# Patient Record
Sex: MALE | Race: WHITE | Employment: FULL TIME | URBAN - METROPOLITAN AREA
[De-identification: names, ages, dates, MRNs, and addresses within clinical notes are randomized per-mention and may not be internally consistent; named-entity substitution may affect disease eponyms.]

---

## 2020-11-16 ENCOUNTER — HOSPITAL ENCOUNTER (OUTPATIENT)
Dept: LAB | Age: 65
Discharge: HOME | End: 2020-11-16

## 2020-11-16 ENCOUNTER — HOSPITAL ENCOUNTER (OUTPATIENT)
Dept: PREADMISSION TESTING | Age: 65
Discharge: HOME | End: 2020-11-16
Payer: COMMERCIAL

## 2020-11-16 ENCOUNTER — TRANSCRIBE ORDER (OUTPATIENT)
Dept: REGISTRATION | Age: 65
End: 2020-11-16

## 2020-11-16 DIAGNOSIS — M16.12 PRIMARY OSTEOARTHRITIS OF LEFT HIP: Primary | ICD-10-CM

## 2020-11-16 DIAGNOSIS — M16.12 PRIMARY OSTEOARTHRITIS OF LEFT HIP: ICD-10-CM

## 2020-11-16 LAB — SENTARA SPECIMEN COL,SENBCF: NORMAL

## 2020-11-16 PROCEDURE — 93005 ELECTROCARDIOGRAM TRACING: CPT

## 2020-11-16 PROCEDURE — 99001 SPECIMEN HANDLING PT-LAB: CPT

## 2020-11-17 LAB
ATRIAL RATE: 81 BPM
CALCULATED P AXIS, ECG09: 71 DEGREES
CALCULATED R AXIS, ECG10: 84 DEGREES
CALCULATED T AXIS, ECG11: 54 DEGREES
DIAGNOSIS, 93000: NORMAL
P-R INTERVAL, ECG05: 134 MS
Q-T INTERVAL, ECG07: 384 MS
QRS DURATION, ECG06: 86 MS
QTC CALCULATION (BEZET), ECG08: 446 MS
VENTRICULAR RATE, ECG03: 81 BPM

## 2020-12-04 ENCOUNTER — HOSPITAL ENCOUNTER (OUTPATIENT)
Dept: PREADMISSION TESTING | Age: 65
Discharge: HOME | End: 2020-12-04
Payer: COMMERCIAL

## 2020-12-04 PROCEDURE — 87635 SARS-COV-2 COVID-19 AMP PRB: CPT

## 2020-12-05 LAB — SARS-COV-2, COV2NT: NOT DETECTED

## 2020-12-09 ENCOUNTER — ANESTHESIA EVENT (OUTPATIENT)
Dept: SURGERY | Age: 65
End: 2020-12-09
Payer: COMMERCIAL

## 2020-12-09 PROBLEM — M16.12 OSTEOARTHRITIS OF LEFT HIP: Chronic | Status: ACTIVE | Noted: 2020-12-09

## 2020-12-10 ENCOUNTER — ANESTHESIA (OUTPATIENT)
Dept: SURGERY | Age: 65
End: 2020-12-10
Payer: COMMERCIAL

## 2020-12-10 ENCOUNTER — HOME HEALTH ADMISSION (OUTPATIENT)
Dept: HOME HEALTH SERVICES | Facility: HOME HEALTH | Age: 65
End: 2020-12-10
Payer: COMMERCIAL

## 2020-12-10 ENCOUNTER — APPOINTMENT (OUTPATIENT)
Dept: GENERAL RADIOLOGY | Age: 65
End: 2020-12-10
Attending: ORTHOPAEDIC SURGERY
Payer: COMMERCIAL

## 2020-12-10 ENCOUNTER — APPOINTMENT (OUTPATIENT)
Dept: GENERAL RADIOLOGY | Age: 65
End: 2020-12-10
Attending: PHYSICIAN ASSISTANT
Payer: COMMERCIAL

## 2020-12-10 ENCOUNTER — HOSPITAL ENCOUNTER (OUTPATIENT)
Age: 65
Discharge: HOME HEALTH | End: 2020-12-10
Attending: ORTHOPAEDIC SURGERY | Admitting: ORTHOPAEDIC SURGERY
Payer: COMMERCIAL

## 2020-12-10 VITALS
DIASTOLIC BLOOD PRESSURE: 84 MMHG | HEART RATE: 73 BPM | HEIGHT: 69 IN | SYSTOLIC BLOOD PRESSURE: 139 MMHG | OXYGEN SATURATION: 95 % | RESPIRATION RATE: 14 BRPM | WEIGHT: 224.25 LBS | BODY MASS INDEX: 33.21 KG/M2 | TEMPERATURE: 97 F

## 2020-12-10 DIAGNOSIS — M16.12 PRIMARY OSTEOARTHRITIS OF LEFT HIP: Primary | Chronic | ICD-10-CM

## 2020-12-10 LAB
ABO + RH BLD: NORMAL
BLOOD GROUP ANTIBODIES SERPL: NORMAL
SPECIMEN EXP DATE BLD: NORMAL

## 2020-12-10 PROCEDURE — 97116 GAIT TRAINING THERAPY: CPT

## 2020-12-10 PROCEDURE — 77030034694 HC SCPL CANADY PLSM DISP USMD -E: Performed by: ORTHOPAEDIC SURGERY

## 2020-12-10 PROCEDURE — 97535 SELF CARE MNGMENT TRAINING: CPT

## 2020-12-10 PROCEDURE — 77030037713 HC CLOSR DEV INCIS ZIP STRY -B: Performed by: ORTHOPAEDIC SURGERY

## 2020-12-10 PROCEDURE — 74011250637 HC RX REV CODE- 250/637: Performed by: SPECIALIST

## 2020-12-10 PROCEDURE — 76010000149 HC OR TIME 1 TO 1.5 HR: Performed by: ORTHOPAEDIC SURGERY

## 2020-12-10 PROCEDURE — 76060000033 HC ANESTHESIA 1 TO 1.5 HR: Performed by: ORTHOPAEDIC SURGERY

## 2020-12-10 PROCEDURE — 74011000250 HC RX REV CODE- 250: Performed by: SPECIALIST

## 2020-12-10 PROCEDURE — 77030041075 HC DRSG AG OPTIFRM MDII -B: Performed by: ORTHOPAEDIC SURGERY

## 2020-12-10 PROCEDURE — 74011250637 HC RX REV CODE- 250/637: Performed by: ORTHOPAEDIC SURGERY

## 2020-12-10 PROCEDURE — 77030040361 HC SLV COMPR DVT MDII -B: Performed by: ORTHOPAEDIC SURGERY

## 2020-12-10 PROCEDURE — 77030020782 HC GWN BAIR PAWS FLX 3M -B: Performed by: ORTHOPAEDIC SURGERY

## 2020-12-10 PROCEDURE — 76210000006 HC OR PH I REC 0.5 TO 1 HR: Performed by: ORTHOPAEDIC SURGERY

## 2020-12-10 PROCEDURE — 77030003666 HC NDL SPINAL BD -A: Performed by: ORTHOPAEDIC SURGERY

## 2020-12-10 PROCEDURE — 74011250636 HC RX REV CODE- 250/636: Performed by: PHYSICIAN ASSISTANT

## 2020-12-10 PROCEDURE — 97161 PT EVAL LOW COMPLEX 20 MIN: CPT

## 2020-12-10 PROCEDURE — 77030038010: Performed by: ORTHOPAEDIC SURGERY

## 2020-12-10 PROCEDURE — 2709999900 HC NON-CHARGEABLE SUPPLY: Performed by: ORTHOPAEDIC SURGERY

## 2020-12-10 PROCEDURE — 74011250636 HC RX REV CODE- 250/636: Performed by: ORTHOPAEDIC SURGERY

## 2020-12-10 PROCEDURE — C1776 JOINT DEVICE (IMPLANTABLE): HCPCS | Performed by: ORTHOPAEDIC SURGERY

## 2020-12-10 PROCEDURE — 77030014144 HC TY SPN ANES BBMI -B: Performed by: SPECIALIST

## 2020-12-10 PROCEDURE — 86900 BLOOD TYPING SEROLOGIC ABO: CPT

## 2020-12-10 PROCEDURE — 73501 X-RAY EXAM HIP UNI 1 VIEW: CPT

## 2020-12-10 PROCEDURE — 74011000250 HC RX REV CODE- 250: Performed by: ORTHOPAEDIC SURGERY

## 2020-12-10 PROCEDURE — 77030027138 HC INCENT SPIROMETER -A: Performed by: ORTHOPAEDIC SURGERY

## 2020-12-10 PROCEDURE — 74011250636 HC RX REV CODE- 250/636: Performed by: SPECIALIST

## 2020-12-10 PROCEDURE — 77030031139 HC SUT VCRL2 J&J -A: Performed by: ORTHOPAEDIC SURGERY

## 2020-12-10 PROCEDURE — 77030013708 HC HNDPC SUC IRR PULS STRY –B: Performed by: ORTHOPAEDIC SURGERY

## 2020-12-10 PROCEDURE — 77010033678 HC OXYGEN DAILY

## 2020-12-10 PROCEDURE — 97165 OT EVAL LOW COMPLEX 30 MIN: CPT

## 2020-12-10 RX ORDER — DEXAMETHASONE SODIUM PHOSPHATE 4 MG/ML
8 INJECTION, SOLUTION INTRA-ARTICULAR; INTRALESIONAL; INTRAMUSCULAR; INTRAVENOUS; SOFT TISSUE ONCE
Status: COMPLETED | OUTPATIENT
Start: 2020-12-10 | End: 2020-12-10

## 2020-12-10 RX ORDER — MAGNESIUM SULFATE 100 %
4 CRYSTALS MISCELLANEOUS AS NEEDED
Status: DISCONTINUED | OUTPATIENT
Start: 2020-12-10 | End: 2020-12-10 | Stop reason: HOSPADM

## 2020-12-10 RX ORDER — OXYCODONE HYDROCHLORIDE 5 MG/1
5-10 TABLET ORAL
Status: DISCONTINUED | OUTPATIENT
Start: 2020-12-10 | End: 2020-12-10 | Stop reason: HOSPADM

## 2020-12-10 RX ORDER — PROPOFOL 10 MG/ML
INJECTION, EMULSION INTRAVENOUS
Status: DISCONTINUED | OUTPATIENT
Start: 2020-12-10 | End: 2020-12-10 | Stop reason: HOSPADM

## 2020-12-10 RX ORDER — DIPHENHYDRAMINE HYDROCHLORIDE 50 MG/ML
12.5 INJECTION, SOLUTION INTRAMUSCULAR; INTRAVENOUS
Status: DISCONTINUED | OUTPATIENT
Start: 2020-12-10 | End: 2020-12-10 | Stop reason: HOSPADM

## 2020-12-10 RX ORDER — METOCLOPRAMIDE HYDROCHLORIDE 5 MG/ML
10 INJECTION INTRAMUSCULAR; INTRAVENOUS
Status: DISCONTINUED | OUTPATIENT
Start: 2020-12-10 | End: 2020-12-10 | Stop reason: HOSPADM

## 2020-12-10 RX ORDER — CEFAZOLIN SODIUM/WATER 2 G/20 ML
2 SYRINGE (ML) INTRAVENOUS ONCE
Status: COMPLETED | OUTPATIENT
Start: 2020-12-10 | End: 2020-12-10

## 2020-12-10 RX ORDER — SODIUM CHLORIDE 0.9 % (FLUSH) 0.9 %
5-40 SYRINGE (ML) INJECTION AS NEEDED
Status: DISCONTINUED | OUTPATIENT
Start: 2020-12-10 | End: 2020-12-10 | Stop reason: HOSPADM

## 2020-12-10 RX ORDER — FENTANYL CITRATE 50 UG/ML
25 INJECTION, SOLUTION INTRAMUSCULAR; INTRAVENOUS AS NEEDED
Status: DISCONTINUED | OUTPATIENT
Start: 2020-12-10 | End: 2020-12-10 | Stop reason: HOSPADM

## 2020-12-10 RX ORDER — ACETAMINOPHEN 500 MG
1000 TABLET ORAL ONCE
Status: COMPLETED | OUTPATIENT
Start: 2020-12-10 | End: 2020-12-10

## 2020-12-10 RX ORDER — MELOXICAM 7.5 MG/1
7.5 TABLET ORAL 2 TIMES DAILY
Qty: 28 TAB | Refills: 0 | Status: SHIPPED | OUTPATIENT
Start: 2020-12-10 | End: 2020-12-24

## 2020-12-10 RX ORDER — ONDANSETRON 2 MG/ML
4 INJECTION INTRAMUSCULAR; INTRAVENOUS ONCE
Status: DISCONTINUED | OUTPATIENT
Start: 2020-12-10 | End: 2020-12-10 | Stop reason: HOSPADM

## 2020-12-10 RX ORDER — PANTOPRAZOLE SODIUM 40 MG/1
40 TABLET, DELAYED RELEASE ORAL DAILY
Status: DISCONTINUED | OUTPATIENT
Start: 2020-12-10 | End: 2020-12-10 | Stop reason: HOSPADM

## 2020-12-10 RX ORDER — ONDANSETRON 2 MG/ML
4 INJECTION INTRAMUSCULAR; INTRAVENOUS
Status: DISCONTINUED | OUTPATIENT
Start: 2020-12-10 | End: 2020-12-10 | Stop reason: HOSPADM

## 2020-12-10 RX ORDER — SODIUM CHLORIDE 0.9 % (FLUSH) 0.9 %
5-40 SYRINGE (ML) INJECTION EVERY 8 HOURS
Status: DISCONTINUED | OUTPATIENT
Start: 2020-12-10 | End: 2020-12-10 | Stop reason: HOSPADM

## 2020-12-10 RX ORDER — CELECOXIB 100 MG/1
200 CAPSULE ORAL ONCE
Status: COMPLETED | OUTPATIENT
Start: 2020-12-10 | End: 2020-12-10

## 2020-12-10 RX ORDER — SODIUM CHLORIDE 9 MG/ML
300 INJECTION, SOLUTION INTRAVENOUS CONTINUOUS
Status: DISCONTINUED | OUTPATIENT
Start: 2020-12-10 | End: 2020-12-10 | Stop reason: HOSPADM

## 2020-12-10 RX ORDER — HYDROMORPHONE HYDROCHLORIDE 1 MG/ML
0.5 INJECTION, SOLUTION INTRAMUSCULAR; INTRAVENOUS; SUBCUTANEOUS
Status: DISCONTINUED | OUTPATIENT
Start: 2020-12-10 | End: 2020-12-10 | Stop reason: HOSPADM

## 2020-12-10 RX ORDER — LANOLIN ALCOHOL/MO/W.PET/CERES
1 CREAM (GRAM) TOPICAL 3 TIMES DAILY
Status: DISCONTINUED | OUTPATIENT
Start: 2020-12-10 | End: 2020-12-10 | Stop reason: HOSPADM

## 2020-12-10 RX ORDER — ZOLPIDEM TARTRATE 5 MG/1
5-10 TABLET ORAL
Status: DISCONTINUED | OUTPATIENT
Start: 2020-12-10 | End: 2020-12-10 | Stop reason: HOSPADM

## 2020-12-10 RX ORDER — DEXTROSE MONOHYDRATE 100 MG/ML
125-250 INJECTION, SOLUTION INTRAVENOUS AS NEEDED
Status: DISCONTINUED | OUTPATIENT
Start: 2020-12-10 | End: 2020-12-10 | Stop reason: HOSPADM

## 2020-12-10 RX ORDER — OXYCODONE HYDROCHLORIDE 5 MG/1
5 TABLET ORAL
Qty: 42 TAB | Refills: 0 | Status: SHIPPED | OUTPATIENT
Start: 2020-12-10 | End: 2020-12-17

## 2020-12-10 RX ORDER — PREGABALIN 25 MG/1
25 CAPSULE ORAL ONCE
Status: COMPLETED | OUTPATIENT
Start: 2020-12-10 | End: 2020-12-10

## 2020-12-10 RX ORDER — TRANEXAMIC ACID 650 1/1
1950 TABLET ORAL ONCE
Status: COMPLETED | OUTPATIENT
Start: 2020-12-10 | End: 2020-12-10

## 2020-12-10 RX ORDER — SODIUM CHLORIDE, SODIUM LACTATE, POTASSIUM CHLORIDE, CALCIUM CHLORIDE 600; 310; 30; 20 MG/100ML; MG/100ML; MG/100ML; MG/100ML
100 INJECTION, SOLUTION INTRAVENOUS CONTINUOUS
Status: DISCONTINUED | OUTPATIENT
Start: 2020-12-10 | End: 2020-12-10 | Stop reason: HOSPADM

## 2020-12-10 RX ORDER — ASPIRIN 81 MG/1
81 TABLET ORAL 2 TIMES DAILY
Status: DISCONTINUED | OUTPATIENT
Start: 2020-12-10 | End: 2020-12-10 | Stop reason: HOSPADM

## 2020-12-10 RX ORDER — KETAMINE HCL IN 0.9 % NACL 50 MG/5 ML
SYRINGE (ML) INTRAVENOUS AS NEEDED
Status: DISCONTINUED | OUTPATIENT
Start: 2020-12-10 | End: 2020-12-10 | Stop reason: HOSPADM

## 2020-12-10 RX ORDER — LIDOCAINE HYDROCHLORIDE 20 MG/ML
INJECTION, SOLUTION EPIDURAL; INFILTRATION; INTRACAUDAL; PERINEURAL AS NEEDED
Status: DISCONTINUED | OUTPATIENT
Start: 2020-12-10 | End: 2020-12-10 | Stop reason: HOSPADM

## 2020-12-10 RX ORDER — SODIUM CHLORIDE, SODIUM LACTATE, POTASSIUM CHLORIDE, CALCIUM CHLORIDE 600; 310; 30; 20 MG/100ML; MG/100ML; MG/100ML; MG/100ML
125 INJECTION, SOLUTION INTRAVENOUS CONTINUOUS
Status: DISCONTINUED | OUTPATIENT
Start: 2020-12-10 | End: 2020-12-10 | Stop reason: HOSPADM

## 2020-12-10 RX ORDER — CEFADROXIL 500 MG/1
500 CAPSULE ORAL 2 TIMES DAILY
Qty: 10 CAP | Refills: 0 | Status: SHIPPED | OUTPATIENT
Start: 2020-12-10 | End: 2020-12-15

## 2020-12-10 RX ORDER — ASPIRIN 81 MG/1
81 TABLET ORAL 2 TIMES DAILY
Qty: 42 TAB | Refills: 0 | Status: SHIPPED | OUTPATIENT
Start: 2020-12-10 | End: 2020-12-31

## 2020-12-10 RX ORDER — FENTANYL CITRATE 50 UG/ML
50 INJECTION, SOLUTION INTRAMUSCULAR; INTRAVENOUS
Status: DISCONTINUED | OUTPATIENT
Start: 2020-12-10 | End: 2020-12-10 | Stop reason: HOSPADM

## 2020-12-10 RX ORDER — ACETAMINOPHEN 500 MG
1000 TABLET ORAL ONCE
Status: DISCONTINUED | OUTPATIENT
Start: 2020-12-10 | End: 2020-12-10

## 2020-12-10 RX ORDER — PROPOFOL 10 MG/ML
INJECTION, EMULSION INTRAVENOUS AS NEEDED
Status: DISCONTINUED | OUTPATIENT
Start: 2020-12-10 | End: 2020-12-10 | Stop reason: HOSPADM

## 2020-12-10 RX ORDER — NALOXONE HYDROCHLORIDE 0.4 MG/ML
0.4 INJECTION, SOLUTION INTRAMUSCULAR; INTRAVENOUS; SUBCUTANEOUS AS NEEDED
Status: DISCONTINUED | OUTPATIENT
Start: 2020-12-10 | End: 2020-12-10 | Stop reason: HOSPADM

## 2020-12-10 RX ORDER — CEFAZOLIN SODIUM/WATER 2 G/20 ML
2 SYRINGE (ML) INTRAVENOUS EVERY 8 HOURS
Status: DISCONTINUED | OUTPATIENT
Start: 2020-12-10 | End: 2020-12-10 | Stop reason: HOSPADM

## 2020-12-10 RX ORDER — OXYCODONE AND ACETAMINOPHEN 5; 325 MG/1; MG/1
1 TABLET ORAL AS NEEDED
Status: DISCONTINUED | OUTPATIENT
Start: 2020-12-10 | End: 2020-12-10 | Stop reason: HOSPADM

## 2020-12-10 RX ORDER — KETOROLAC TROMETHAMINE 15 MG/ML
15 INJECTION, SOLUTION INTRAMUSCULAR; INTRAVENOUS EVERY 6 HOURS
Status: DISCONTINUED | OUTPATIENT
Start: 2020-12-10 | End: 2020-12-10 | Stop reason: HOSPADM

## 2020-12-10 RX ORDER — DOCUSATE SODIUM 100 MG/1
100 CAPSULE, LIQUID FILLED ORAL DAILY
Status: DISCONTINUED | OUTPATIENT
Start: 2020-12-11 | End: 2020-12-10 | Stop reason: HOSPADM

## 2020-12-10 RX ORDER — SODIUM CHLORIDE 9 MG/ML
125 INJECTION, SOLUTION INTRAVENOUS CONTINUOUS
Status: DISCONTINUED | OUTPATIENT
Start: 2020-12-10 | End: 2020-12-10 | Stop reason: HOSPADM

## 2020-12-10 RX ORDER — KETOROLAC TROMETHAMINE 15 MG/ML
15 INJECTION, SOLUTION INTRAMUSCULAR; INTRAVENOUS
Status: DISCONTINUED | OUTPATIENT
Start: 2020-12-10 | End: 2020-12-10 | Stop reason: HOSPADM

## 2020-12-10 RX ORDER — PANTOPRAZOLE SODIUM 40 MG/1
40 TABLET, DELAYED RELEASE ORAL DAILY
Status: DISCONTINUED | OUTPATIENT
Start: 2020-12-10 | End: 2020-12-10

## 2020-12-10 RX ORDER — MIDAZOLAM HYDROCHLORIDE 1 MG/ML
INJECTION, SOLUTION INTRAMUSCULAR; INTRAVENOUS AS NEEDED
Status: DISCONTINUED | OUTPATIENT
Start: 2020-12-10 | End: 2020-12-10 | Stop reason: HOSPADM

## 2020-12-10 RX ORDER — ACETAMINOPHEN 325 MG/1
650 TABLET ORAL EVERY 6 HOURS
Status: DISCONTINUED | OUTPATIENT
Start: 2020-12-10 | End: 2020-12-10 | Stop reason: HOSPADM

## 2020-12-10 RX ADMIN — PROPOFOL 20 MG: 10 INJECTION, EMULSION INTRAVENOUS at 10:10

## 2020-12-10 RX ADMIN — PANTOPRAZOLE SODIUM 40 MG: 40 TABLET, DELAYED RELEASE ORAL at 07:05

## 2020-12-10 RX ADMIN — PREGABALIN 25 MG: 25 CAPSULE ORAL at 07:05

## 2020-12-10 RX ADMIN — Medication 10 MG: at 09:55

## 2020-12-10 RX ADMIN — MIDAZOLAM 2 MG: 1 INJECTION INTRAMUSCULAR; INTRAVENOUS at 09:35

## 2020-12-10 RX ADMIN — PROPOFOL 20 MG: 10 INJECTION, EMULSION INTRAVENOUS at 09:39

## 2020-12-10 RX ADMIN — DEXAMETHASONE SODIUM PHOSPHATE 8 MG: 4 INJECTION, SOLUTION INTRAMUSCULAR; INTRAVENOUS at 07:06

## 2020-12-10 RX ADMIN — Medication 10 MG: at 09:43

## 2020-12-10 RX ADMIN — Medication 20 MG: at 09:46

## 2020-12-10 RX ADMIN — LIDOCAINE HYDROCHLORIDE 20 MG: 20 INJECTION, SOLUTION EPIDURAL; INFILTRATION; INTRACAUDAL; PERINEURAL at 09:39

## 2020-12-10 RX ADMIN — PROPOFOL 100 MCG/KG/MIN: 10 INJECTION, EMULSION INTRAVENOUS at 10:16

## 2020-12-10 RX ADMIN — LIDOCAINE HYDROCHLORIDE 20 MG: 20 INJECTION, SOLUTION EPIDURAL; INFILTRATION; INTRACAUDAL; PERINEURAL at 10:24

## 2020-12-10 RX ADMIN — CELECOXIB 200 MG: 100 CAPSULE ORAL at 07:05

## 2020-12-10 RX ADMIN — TRANEXAMIC ACID 1950 MG: 650 TABLET ORAL at 07:05

## 2020-12-10 RX ADMIN — SODIUM CHLORIDE 300 ML/HR: 900 INJECTION, SOLUTION INTRAVENOUS at 12:24

## 2020-12-10 RX ADMIN — PROPOFOL 20 MG: 10 INJECTION, EMULSION INTRAVENOUS at 10:24

## 2020-12-10 RX ADMIN — SODIUM CHLORIDE, SODIUM LACTATE, POTASSIUM CHLORIDE, AND CALCIUM CHLORIDE 125 ML/HR: 600; 310; 30; 20 INJECTION, SOLUTION INTRAVENOUS at 07:04

## 2020-12-10 RX ADMIN — MIDAZOLAM 2 MG: 1 INJECTION INTRAMUSCULAR; INTRAVENOUS at 10:15

## 2020-12-10 RX ADMIN — Medication 2 G: at 09:59

## 2020-12-10 RX ADMIN — MIDAZOLAM 2 MG: 1 INJECTION INTRAMUSCULAR; INTRAVENOUS at 09:46

## 2020-12-10 RX ADMIN — LIDOCAINE HYDROCHLORIDE 20 MG: 20 INJECTION, SOLUTION EPIDURAL; INFILTRATION; INTRACAUDAL; PERINEURAL at 10:10

## 2020-12-10 RX ADMIN — MEPIVACAINE HYDROCHLORIDE 44 MG: 20 INJECTION, SOLUTION EPIDURAL; INFILTRATION at 10:00

## 2020-12-10 RX ADMIN — SODIUM CHLORIDE, SODIUM LACTATE, POTASSIUM CHLORIDE, AND CALCIUM CHLORIDE 1000 ML: 600; 310; 30; 20 INJECTION, SOLUTION INTRAVENOUS at 07:05

## 2020-12-10 RX ADMIN — SODIUM CHLORIDE, SODIUM LACTATE, POTASSIUM CHLORIDE, AND CALCIUM CHLORIDE: 600; 310; 30; 20 INJECTION, SOLUTION INTRAVENOUS at 10:18

## 2020-12-10 RX ADMIN — Medication 10 MG: at 09:35

## 2020-12-10 RX ADMIN — ACETAMINOPHEN 1000 MG: 500 TABLET ORAL at 07:05

## 2020-12-10 NOTE — INTERVAL H&P NOTE
Update History & Physical 
 
The Patient's History and Physical of December 9,2020,  
  was reviewed with the patient and I examined the patient. There was no change. The surgical site was confirmed by the patient and me. Plan:  The risk, benefits, expected outcome, and alternative to the recommended procedure have been discussed with the patient. Patient understands and wants to proceed with the procedure.  
 
Electronically signed by Nathan Clarke MD on 12/10/2020 at 7:18 AM

## 2020-12-10 NOTE — PROGRESS NOTES
Problem: Mobility Impaired (Adult and Pediatric)  Goal: *Acute Goals and Plan of Care (Insert Text)  Description: PT goals to be met in 1 day:  Pt will be able to perform supine<>sit SBA for transfers at home. Pt will be able to perform sit<>stand SBA for increased ability to transfer at home safely. Pt will be able to participate in gt training >125' w/ RW, WBAT, GB and CGA/SBA for improved ability in home upon d/c. Pt will be able to perform stair training step to pattern, B/U rail and CGA to obtain safe entry into home upon d/c. Pt will be educated regarding HEP per MD protocol for optimal AROM/strength outcomes. Note: [x]  Patient has met MD mobilization critieria for d/c home   [x]  Recommend HH with 24 hour adult care   []  Benefit from additional acute PT session to address:      PHYSICAL THERAPY EVALUATION    Patient: Marybel Kraft (41 y.o. male)  Date: 12/10/2020  Primary Diagnosis: Osteoarthritis of left hip [M16.12]  Procedure(s) (LRB):  LEFT TOTAL HIP REPLACEMENT ANTERIOR APPROACH WITH C-ARM (Left) Day of Surgery   Precautions:   Fall, WBAT    PLOF: Independent functional mobility w/o use of AD    ASSESSMENT :  Based on the objective data described below, the patient presents with decreased mobility in regards to bed mobility, transfers, gt quality and tolerance, balance, stair negotiation and safety due to L DEANN surgery. Decreased AROM of L hip, dec strength of L hip, pain in L hip, dec sensation of L hip also impacting pt functional mobility. Pt rating pain on numerical pain scale pre/post and during session 4/10. Pt and wife ed regarding mobility safety, WB, HEP, ice application/use, elevation, environmental safety and home safe techniques. Pt sitting in recliner upon arrival.  Pt able to perform sit<>stand w/ CGA/SBA. Safety vc required throughout session to reinforce safety. Pt able to participate in gt training using RW, GB, WBAT and CGA w/ antalgic gt pattern.   Pt was able to participate in stair training using step to pattern, B rails and CGA. Answered questions by pt and wife in regards to PT and mobility. Pt left sitting in recliner w/ all needs within reach and ice pack to L hip. Nurse Zee Carias aware of session and outcomes. Recommend HHPT with responsible adult care at least 24 hours upon hospital d/c. Patient will benefit from skilled intervention to address the above impairments. Patient's rehabilitation potential is considered to be Good  Factors which may influence rehabilitation potential include:   []         None noted  []         Mental ability/status  []         Medical condition  []         Home/family situation and support systems  []         Safety awareness  [x]         Pain tolerance/management  []         Other:      PLAN :  Recommendations and Planned Interventions:   [x]           Bed Mobility Training             []    Neuromuscular Re-Education  [x]           Transfer Training                   []    Orthotic/Prosthetic Training  [x]           Gait Training                          [x]    Modalities  [x]           Therapeutic Exercises           [x]    Edema Management/Control  [x]           Therapeutic Activities            [x]    Family Training/Education  [x]           Patient Education  []           Other (comment):    Frequency/Duration: Patient will be followed by physical therapy twice daily to address goals. Discharge Recommendations: Home Health  Further Equipment Recommendations for Discharge: N/A     SUBJECTIVE:   Patient stated I am surprised I feel so well.     OBJECTIVE DATA SUMMARY:     Past Medical History:   Diagnosis Date    Arthritis      Past Surgical History:   Procedure Laterality Date    HX KNEE ARTHROSCOPY  left    HX OTHER SURGICAL Right     collar bone surgery    HX OTHER SURGICAL  2019    keratosis removed from scalp    HX REFRACTIVE SURGERY      HX THORACIC LAMINECTOMY      HX TONSILLECTOMY      HX WISDOM TEETH EXTRACTION Barriers to Learning/Limitations: yes;  anesthesia  Compensate with: Visual Cues, Verbal Cues, and Tactile Cues  Home Situation:  Home Situation  Home Environment: Private residence  # Steps to Enter: 1  One/Two Story Residence: One story  Living Alone: No  Support Systems: Spouse/Significant Other/Partner  Patient Expects to be Discharged to[de-identified] Private residence  Current DME Used/Available at Home: Walker, rolling  Tub or Shower Type: Shower  Critical Behavior:  Neurologic State: Alert  Orientation Level: Oriented to person;Oriented to place;Oriented to situation  Cognition: Follows commands  Safety/Judgement: Awareness of environment  Psychosocial  Patient Behaviors: Calm; Cooperative  Family  Behaviors: Calm;Supportive  Purposeful Interaction: Yes  Pt Identified Daily Priority: Clinical issues (comment)  Caring Interventions: Reassure; Therapeutic modalities  Reassure: Therapeutic listening; Informing  Therapeutic Modalities: Deep breathing  Skin Condition/Temp: Dry;Warm  Family  Behaviors: Calm;Supportive  Skin Integrity: Incision (comment)(L hip)  Skin Integumentary  Skin Color: Appropriate for ethnicity  Skin Condition/Temp: Dry;Warm  Skin Integrity: Incision (comment)(L hip)  Strength:    Strength: Generally decreased, functional  Tone & Sensation:   Tone: Normal  Sensation: Impaired(L hip)  Range Of Motion:  AROM: Generally decreased, functional  Functional Mobility:  Bed Mobility:  Scooting: Stand-by assistance(vc)  Transfers:  Sit to Stand: Contact guard assistance(vc)  Stand to Sit: Contact guard assistance;Stand-by assistance(vc)  Balance:   Sitting: Intact  Standing: Intact; With support  Ambulation/Gait Training:  Distance (ft): 125 Feet (ft)  Assistive Device: Walker, rolling;Gait belt  Ambulation - Level of Assistance: Contact guard assistance(vc)  Gait Abnormalities: Antalgic;Decreased step clearance; Step to gait  Left Side Weight Bearing: As tolerated  Base of Support: Shift to right  Stance: Left decreased  Speed/Mitzi: Slow  Step Length: Left shortened;Right shortened  Swing Pattern: Left asymmetrical;Right asymmetrical  Interventions: Safety awareness training; Tactile cues; Verbal cues; Visual/Demos  Stairs:  Number of Stairs Trained: 5  Stairs - Level of Assistance: Contact guard assistance(vc)  Rail Use: Both  Therapeutic Exercises:   Encouraged HEP  Pain:  Pain level pre-treatment: 4/10   Pain level post-treatment: 4/10   Pain Intervention(s) : Medication (see MAR); Rest, Ice, Repositioning  Response to intervention: Nurse notified, See doc flow    Activity Tolerance:   Fair   Please refer to the flowsheet for vital signs taken during this treatment. After treatment:   [x]         Patient left in no apparent distress sitting up in chair  []         Patient left in no apparent distress in bed  [x]         Call bell left within reach  [x]         Nursing notified  [x]         Caregiver present  []         Bed alarm activated  []         SCDs applied    COMMUNICATION/EDUCATION:   [x]         Role of Physical Therapy in the acute care setting. [x]         Fall prevention education was provided and the patient/caregiver indicated understanding. [x]         Patient/family have participated as able in goal setting and plan of care. [x]         Patient/family agree to work toward stated goals and plan of care. []         Patient understands intent and goals of therapy, but is neutral about his/her participation. []         Patient is unable to participate in goal setting/plan of care: ongoing with therapy staff.  []         Other:     Thank you for this referral.  Davie Arroyo, PT   Time Calculation: 23 mins      Eval Complexity: History: HIGH Complexity :3+ comorbidities / personal factors will impact the outcome/ POC Exam:MEDIUM Complexity : 3 Standardized tests and measures addressing body structure, function, activity limitation and / or participation in recreation  Presentation: LOW Complexity : Stable, uncomplicated  Clinical Decision Making:Low Complexity    Overall Complexity:LOW

## 2020-12-10 NOTE — PROGRESS NOTES
Problem: Self Care Deficits Care Plan (Adult)  Goal: *Acute Goals and Plan of Care (Insert Text)  Description: Initial Occupational Therapy Goals (12/10/2020) Within 7 day(s):    1. Patient will perform grooming standing sinkside with supervision for increased independence with ADLs. 2. Patient will perform LB dressing with supervision & A/E PRN for increased independence with ADLs. 3. Patient will perform toilet transfer with supervision for increased independence with ADLs. 4. Patient will perform all aspects of toileting with supervision for increased independence with ADLs. 5. Patient will independently apply energy conservation techniques with 1 verbal cue(s)for increased independence with ADLs. 6. Patient will perform bathroom mobility with supervision for increased independence/safety with ADLs. Outcome: Progressing Towards Goal   OCCUPATIONAL THERAPY EVALUATION    Patient: Jennifer Welch (60 y.o. male)  Date: 12/10/2020  Primary Diagnosis: Osteoarthritis of left hip [M16.12]  Procedure(s) (LRB):  LEFT TOTAL HIP REPLACEMENT ANTERIOR APPROACH WITH C-ARM (Left) Day of Surgery   Precautions: Fall, WBAT  PLOF: pt independent for ADLs/functional mobility PTA    ASSESSMENT AND RECOMMENDATIONS:  Based on the objective data described below, the patient presents with LLE decreased ROM and strength affecting LE ADLs. Pt found seated in recliner chair, vitals assessed and WNL, pt reporting pain 4/10. Pt completed upper body dressing with supervision seated in chair. Patient able to utilize RLE ankle-over-knee technique and bending forward with LLE for sock donning. Pt able to thread B feet through underwear/pants without assist, and CGA when standing to pull up to waist. Pt CGA/SBA for STS/bathroom mobility with vc for proper use of RW and safe body mechanics. Pt ambulated back to recliner chair, ice applied to L hip. Spouse present during session for education on home safety.  Provided opportunity for pt to voice questions on ADL performance when home, pt has no further concerns. Patient will benefit from skilled Occupational Therapy intervention to maximize safety/independence with ADLs at d/c.    Education: Reviewed home safety, body mechanics, importance of moving every hour to prevent joint stiffness, role of ice for edema/pain control, Rolling Walker management/safety, and adaptive dressing techniques with patient verbalizing  understanding at this time     Patient will benefit from skilled intervention to address the above impairments. Patient's rehabilitation potential is considered to be Good  Factors which may influence rehabilitation potential include:   [x]             None noted  []             Mental ability/status  []             Medical condition  []             Home/family situation and support systems  []             Safety awareness  []             Pain tolerance/management  []             Other:        PLAN :  Recommendations and Planned Interventions:   [x]               Self Care Training                  [x]      Therapeutic Activities  [x]               Functional Mobility Training   []      Cognitive Retraining  []               Therapeutic Exercises           []      Endurance Activities  []               Balance Training                    []      Neuromuscular Re-Education  []               Visual/Perceptual Training     [x]      Home Safety Training  [x]               Patient Education                   [x]      Family Training/Education  []               Other (comment):    Frequency/Duration: Patient will be followed by Occupational Therapy 1-2 times per day/4-7 days per week to address goals. Discharge Recommendations: Home health with adult supervision at least 24 hours after d/c  Further Equipment Recommendations for Discharge: N/A     SUBJECTIVE:   Patient stated i'm feeling pretty good.     OBJECTIVE DATA SUMMARY:     Past Medical History:   Diagnosis Date    Arthritis      Past Surgical History:   Procedure Laterality Date    HX KNEE ARTHROSCOPY  left    HX OTHER SURGICAL Right     collar bone surgery    HX OTHER SURGICAL  2019    keratosis removed from scalp    HX REFRACTIVE SURGERY      HX THORACIC LAMINECTOMY      HX TONSILLECTOMY      HX WISDOM TEETH EXTRACTION       Barriers to Learning/Limitations: yes;  physical and altered mental status (i.e.Sedation, Confusion)  Compensate with: visual, verbal, tactile, kinesthetic cues/model    Home Situation/Prior Level of Function: pt independent for ADLs/functional mobility  Home Situation  Home Environment: Private residence  # Steps to Enter: 1  One/Two Story Residence: One story  Living Alone: No  Support Systems: Spouse/Significant Other/Partner  Patient Expects to be Discharged to[de-identified] Private residence  Current DME Used/Available at Home: Walker, rolling  Tub or Shower Type: Shower  []  Right hand dominant   []  Left hand dominant    Cognitive/Behavioral Status:  Neurologic State: Alert  Orientation Level: Oriented to person;Oriented to place;Oriented to situation  Cognition: Follows commands  Safety/Judgement: Awareness of environment    Skin: L hip incision w/ Mepilex   Edema: compression hose in place & applied ice     Coordination: BUE  Coordination: Within functional limits  Fine Motor Skills-Upper: Left Intact; Right Intact    Gross Motor Skills-Upper: Left Intact; Right Intact    Balance:  Sitting: Intact  Standing: Intact; With support    Strength: BUE  Strength: Generally decreased, functional     Tone & Sensation:BUE  Tone: Normal  Sensation: Impaired(L hip)    Range of Motion: BUE  AROM: Generally decreased, functional     Functional Mobility and Transfers for ADLs:  Bed Mobility:  Scooting: Stand-by assistance    Transfers:  Sit to Stand: Contact guard assistance(vc)    ADL Assessment:  Feeding: Modified independent    Oral Facial Hygiene/Grooming: Contact guard assistance    Bathing: Minimum assistance    Upper Body Dressing: Supervision    Lower Body Dressing: Contact guard assistance    Toileting: Stand by assistance     ADL Intervention  Upper Body Dressing Assistance  Dressing Assistance: Supervision  Pullover Shirt: Supervision    Lower Body Dressing Assistance  Dressing Assistance: Contact guard assistance  Underpants: Contact guard assistance  Pants With Elastic Waist: Contact guard assistance  Leg Crossed Method Used: No  Position Performed: Seated in chair  Cues: Verbal cues provided;Visual cues provided    Cognitive Retraining  Safety/Judgement: Awareness of environment    Pain:  Pain level pre-treatment: 4/10  Pain level post-treatment: 4/10  Pain Intervention(s): Medication administer by Nursing (see MAR); Rest, Ice, Repositioning   Response to intervention: Nurse notified, see doc flow     Activity Tolerance:   Fair. Patient able to stand ~5 minute(s). Patient able to complete ADLs with intermittent rest breaks. Patient limited by pain, strength, ROM. Patient unsteady. Please refer to the flowsheet for vital signs taken during this treatment. After treatment:   [x]  Patient left in no apparent distress sitting up in chair  []  Patient sitting on EOB  []  Patient left in no apparent distress in bed  [x]  Call bell left within reach  [x]  Nursing notified  [x]  Caregiver present  [x]  Ice applied  []  SCD's on while back in bed  [] Bed alarm activated    COMMUNICATION/EDUCATION:   Communication/Collaboration:  [x]       Role of Occupational Therapy in the acute care setting. [x]      Home safety education was provided and the patient/caregiver indicated understanding. [x]      Patient/family have participated as able in goal setting and plan of care. [x]      Patient/family agree to work toward stated goals and plan of care. []      Patient understands intent and goals of therapy, but is neutral about his/her participation. []      Patient is unable to participate in plan of care at this time.     Thank you for this referral.  Gerardo Roberts, OTR/L  Time Calculation: 23 mins    Eval Complexity: History: MEDIUM Complexity : Expanded review of history including physical, cognitive and psychosocial  history ; Examination: LOW Complexity : 1-3 performance deficits relating to physical, cognitive , or psychosocial skils that result in activity limitations and / or participation restrictions ;    Decision Making:LOW Complexity : No comorbidities that affect functional and no verbal or physical assistance needed to complete eval tasks

## 2020-12-10 NOTE — ANESTHESIA PREPROCEDURE EVALUATION
Relevant Problems   No relevant active problems       Anesthetic History   No history of anesthetic complications     Pertinent negatives: No PONV       Review of Systems / Medical History  Patient summary reviewed, nursing notes reviewed and pertinent labs reviewed    Pulmonary              Pertinent negatives: No COPD, asthma and smoker     Neuro/Psych   Within defined limits           Cardiovascular  Within defined limits              Pertinent negatives: No hypertension, past MI and CAD       GI/Hepatic/Renal  Within defined limits           Pertinent negatives: No GERD, liver disease and renal disease   Endo/Other  Within defined limits      Obesity and arthritis  Pertinent negatives: No diabetes   Other Findings   Comments: etoh - 21/week           Physical Exam    Airway  Mallampati: III  TM Distance: > 6 cm  Neck ROM: normal range of motion   Mouth opening: Normal     Cardiovascular               Dental    Dentition: Caps/crowns     Pulmonary                 Abdominal         Other Findings            Anesthetic Plan    ASA: 2  Anesthesia type: spinal          Induction: Intravenous  Anesthetic plan and risks discussed with: Patient      Spinal has several small advantages over general anesthesia. All are small but definite differences that have been shown in studies  Lower infection rate  Less Blood loss  Less Pain  Less N&V  Quicker times to discharge. Less exposure to drugs that have been thought to affect the mind afterwards. Data not solid on this one yet. In choosing the spinal, there is another choice. The drug your surgeon prefers is called Mepivacaine. This drug has been used since 1956 for spinals and much more commonly for the past 15 years - especially at the [facility] an orthopedic hospital in [facility] which claims to be the [place] #1 in orthopedics for 10 straight years. However, it is not approved by the FDA for use in spinal. The bottle even states not for spinal use.  It will never be approved as it is generic and there is nobody that is willing to put up the millions it would take to get it approved for this use. The alternative is Bupivacaine. It was approved > 35 years ago for spinal use while it was still a patented drug. The problem with this drug is that it lasts so long that it may interfere with your physical therapy today.      Patient chooses Violeta Carranza

## 2020-12-10 NOTE — ANESTHESIA PROCEDURE NOTES
Spinal Block    Start time: 12/10/2020 9:50 AM  End time: 12/10/2020 10:01 AM  Performed by: Aristeo Bautista MD  Authorized by: Aristeo Bautista MD     Pre-procedure: Indications: primary anesthetic  Preanesthetic Checklist: risks and benefits discussed, site marked and timeout performed      Spinal Block:   Patient Position:  Seated  Prep Region:  Lumbar  Prep: chlorhexidine      Location:  L4-5          Needle:   Needle Type:  Pencil-tip  Needle Gauge:  25 G  Attempts:  1      Events: CSF confirmed, no blood with aspiration and no paresthesia        Assessment:  Insertion:  Uncomplicated    Spinal Placement    Patient placed in sitting position, back prepped and draped. Lidocaine infiltrated,   Needle placed. 3.5\" too short. 5\", ,crystal clear first pass. Good flow. Spinal fluid obtained. Aspirate 0.8 ml CSF  Inject    Mepiv 2% + CSF. Good flow before, mid injection and after.

## 2020-12-10 NOTE — PERIOP NOTES
TRANSFER - IN REPORT:    Verbal report received from ELIJAH & Dr. Orville Harada  on Frandy Tuttle  being received from OR (unit) for routine post - op      Report consisted of patients Situation, Background, Assessment and   Recommendations(SBAR). Information from the following report(s) SBAR was reviewed with the receiving nurse. Opportunity for questions and clarification was provided. Assessment completed upon patients arrival to unit and care assumed.

## 2020-12-10 NOTE — DISCHARGE INSTRUCTIONS
[id number] [facility]   Patient Discharge Instructions    Irineo Douglas / [id number] : 1955    Admitted 12/10/2020 Discharged: 12/10/2020     IF YOU HAVE ANY PROBLEMS ONCE YOU ARE AT HOME CALL THE FOLLOWING NUMBERS:   Main office number: [phone]    Your follow up appointment to see either Dr. Lala Stacy PA-C, or [facility] VALENTE as scheduled in 2 weeks. If you are unsure of your appointment date call the office at [phone]. Medication Instructions     · Resume your home medictions as directed, you may have directed not to resume supplements until after your follow up. · A prescription for pain medication has been given   · It is important that you take the medication exactly as they are prescribed. · Keep your medication in the bottles provided by the pharmacist and keep a list of the medication names, dosages, and times to be taken in your wallet. · Do not take other medications without consulting your doctor. What to do at [address] [place], [place] [address] your prehospital diet. If you have excessive nausea or vomitting call your doctor's office. Be sure to maintain adequate fluid intake. Some pain medications may cause constipation. Remember to drink fluids, stay as active as possible, and eat plenty of fiber-rich foods. Begin In-Home Physical Therapy; 3 times a week to work on gait training, range of motion, strengthening, and weight bearing exercises as tolerable. Continue to use your walker or cane when walking. May progress from the walker to a cane to complete total bearing as tolerable. Patient may shower. Wrap incision with plastic wrap/covering to prevent incision from getting wet. Avoid complete immersion. YOUR DRESSING SHOULD BE CHANGED BY YOUR HOME HEALTH NURSE 5-7 AFTER SURGERY ACCORDING TO THE DATE WRITTEN ON YOUR DRESSING.           When to Call    - Call if you have a temperature greater then 101  - Unable to keep food down  - Are unable to bear any wieght   - Need a pain medication refill     Information obtained by :  I understand that if any problems occur once I am at home I am to contact my physician. I understand and acknowledge receipt of the instructions indicated above.                                                                                                                                            Physician's or R.N.'s Signature                                                                  Date/Time                                                                                                                                              Patient or Representative Signature                                                          Date/Time

## 2020-12-10 NOTE — PROGRESS NOTES
Progression of Symptoms:    [] Pain 0/10   [] Improvement post medication administration   [] No improvement, provider notified   [] Nausea   [] Improvement post medication administration   [] No improvement, provider notified   [] Hypotension/Hypertension   [] Improved post medication administration   [] No improvement, provider notified     Readiness for Discharge:  [x] Vital signs stable  [x] + Voiding  [x] Wound intact, drainage minimal  [x] Tolerating PO intake  [x] Cleared by PT (OT if applicable)  [x] Discharge education completed with patient and family member to specifically include   [x] Recommended stool softener  [x] Proper elevation visual demonstration

## 2020-12-10 NOTE — PERIOP NOTES
TRANSFER - OUT REPORT:    Verbal report given to Benoit Kunz RN on [facility]  being transferred to [address] [place] [address] (unit) for routine post - op       Report consisted of patients Situation, Background, Assessment and   Recommendations(SBAR). Information from the following report(s) SBAR was reviewed with the receiving nurse. Lines:   Peripheral IV 12/10/20 Left Forearm (Active)   Site Assessment Clean, dry, & intact 12/10/20 1138   Phlebitis Assessment 0 12/10/20 1138   Infiltration Assessment 0 12/10/20 1138   Dressing Status Clean, dry, & intact 12/10/20 1138   Dressing Type Tape 12/10/20 1138   Hub Color/Line Status Infusing 12/10/20 1138        Opportunity for questions and clarification was provided.       Patient transported with:   Registered Nurse   S/p spinal

## 2020-12-10 NOTE — ANESTHESIA POSTPROCEDURE EVALUATION
Procedure(s):  LEFT TOTAL HIP REPLACEMENT ANTERIOR APPROACH WITH C-ARM. spinal    Anesthesia Post Evaluation        Comments: Post-Anesthesia Evaluation and Assessment    Cardiovascular Function/Vital Signs  /84   Pulse 73   Temp 36.1 °C (97 °F)   Resp 14   Ht 5' 9\" (1.753 m)   Wt 101.7 kg (224 lb 4 oz)   SpO2 95%   BMI 33.12 kg/m²     Patient is status post Procedure(s):  LEFT TOTAL HIP REPLACEMENT ANTERIOR APPROACH WITH C-ARM. Nausea/Vomiting: Controlled. Postoperative hydration reviewed and adequate. Pain:  Pain Scale 1: Numeric (0 - 10) (12/10/20 1139)  Pain Intensity 1: 0 (12/10/20 1139)   Managed. Neurological Status:   Neuro (WDL): Within Defined Limits (12/10/20 1139)   At baseline. Mental Status and Level of Consciousness: Arousable. Pulmonary Status:   O2 Device: Room air (12/10/20 1139)   Adequate oxygenation and airway patent. Complications related to anesthesia: None    Post-anesthesia assessment completed. No concerns. Patient has met all discharge requirements.     Signed By: Jenn García MD    December 10, 2020                     INITIAL Post-op Vital signs:   Vitals Value Taken Time   /84 12/10/2020 11:58 AM   Temp 36.1 °C (97 °F) 12/10/2020 11:58 AM   Pulse 73 12/10/2020 11:58 AM   Resp 14 12/10/2020 11:58 AM   SpO2 95 % 12/10/2020 11:58 AM

## 2020-12-10 NOTE — OP NOTES
[address] [facility] [address] Medicine  Total Hip Arthroplasty      Patient: Jennifer Welch MRN: [id number]  SSN: [id number]    YOB: 1955  Age: 60 y.o. Sex: male      Date of Surgery: 12/10/2020   Preoperative Diagnosis: LEFT HIP OSTEOARTHRITIS   Postoperative Diagnosis: LEFT HIP OSTEOARTHRITIS   Location: [facility]  Surgeon: Smiley Golden MD  Assistant: Larissa Alfredo PA-C    Anesthesia: general    Procedure: Total Left Hip Arthroplasty    Findings: Degenerative joint disease of the left hip. Estimated Blood Loss: 300ml    Specimens: None    Complications: none    Implants:   Implant Name Type Inv. Item Serial No.  Lot No. LRB No. Used Action   logical g series acetabular shell    [employer] [id number] Left 1 Implanted   logical cup  lilner  neutral     [employer] [id number] Left 1 Implanted   hip stem # 6 high offset collared     [employer] [id number] Left 1 Implanted   femoral head 36mm +0mm    [employer] [id number] Left 1 Implanted       Procedure Detail:  After the patient was brought to the operating suite, He was effectively anesthetized using general anesthesia, then transferred to the [place] table and secured in a standard fashion. His left hip was then prepped and draped in a normal sterile orthopedic fashion. He was given appropriate intravenous antibiotics preoperatively. After a proper timeout was performed, a direct anterior approach to the hip was performed using a short [place]-Ralph interval. Anterior capsulotomy was performed. The degenerative changes of the hip were noted. Femoral neck osteotomy was then performed to the templated area. The head and neck were removed. The pulvinar and labrum were excised. The acetabulum was then reamed up to 54 mm with good bleeding cancellous bone obtained. The cup was then irrigated with pulse lavage system.  A 54 mm Signature Logical G cup was then impacted in place with excellent stable fixation obtained, placing the cup at about 45 degrees of abduction, 20 degrees of anteversion. The liner was then impacted in place. A screw was not placed. Attention was turned to the femur, which was delivered into the wound with a combination of extension, external rotation, and adduction, and using the hook on the [place] table to deliver the femur into the wound. The canal was broached up to a size 6 for the Spartan stem system with excellent stable fixation obtained. A trial reduction was then performed with the standard neck offset and 36 mm head balls with various neck lengths. With the +0 high offset, he appeared to have equalization of leg lengths and restoration of offset radiographically using the c-arm and joint point navigation system, and excellent functional stability was noted. The trial broach was removed. The canal was irrigated with the pulse lavage system. The final components were impacted in place with excellent stable fixation obtained once again. The final reduction was performed and once again leg lengths and offset were restored radiographically, using the C-arm radiographically intraoperatively, and excellent functional stability was noted. The wound was then irrigated one more time, and then closed in layers. The fascia of the tensor was closed with #1 Vicryl in a running type stitch. Subcutaneous tissue was closed with 2-0 Vicryl in a simple buried stitch, and the skin was closed with Prineo. Dry, sterile dressing was then applied. He tolerated this well, was transferred to the bed, and taken to recovery room, extubated, in stable condition. All sponge and needle counts were correct.     Signed By: Edin Méndez MD     December 10, 2020

## 2020-12-10 NOTE — H&P
[address] [facility] [address] Medicine  History and Physical Exam    Patient: Tahmina Cassidy MRN: [id number]  SSN: [id number]    YOB: 1955  Age: 60 y.o. Sex: male      Subjective:      Chief Complaint: Left hip pain    History of Present Illness:  Patient complains of left hip pain and difficulty ambulating, which has progressed over the past several months. X-rays showed osteoarthritis of the joint. The patient's pain has persisted and progressed despite conservative treatments and therapies. The patient has been previously treated with medications/injections. The patient has at this time opted for surgical intervention. Past Medical History:   Diagnosis Date    Arthritis      Past Surgical History:   Procedure Laterality Date    HX KNEE ARTHROSCOPY  left    HX OTHER SURGICAL Right     collar bone surgery    HX OTHER SURGICAL  2019    keratosis removed from scalp    HX REFRACTIVE SURGERY      HX THORACIC LAMINECTOMY      HX TONSILLECTOMY      HX WISDOM TEETH EXTRACTION       Social History     Occupational History    Not on file   Tobacco Use    Smoking status: Former Smoker     Last attempt to quit:      Years since quittin.9    Smokeless tobacco: Never Used   Substance and Sexual Activity    Alcohol use: Yes     Alcohol/week: 7.0 standard drinks     Types: 7 Cans of beer per week     Comment: 7 beers a week    Drug use: Not Currently    Sexual activity: Not on file     Prior to Admission medications    Medication Sig Start Date End Date Taking? Authorizing Provider   acetaminophen (Tylenol Arthritis Pain) 650 mg TbER Take 650 mg by mouth every eight (8) hours. Provider, Historical   naproxen sodium (Aleve) 220 mg tablet Take 220 mg by mouth two (2) times daily (with meals). Provider, Historical   multivitamin (ONE A DAY) tablet Take 1 Tab by mouth daily.     Provider, Historical       Allergies: No Known Allergies     Review of Systems:  Pertinent items are noted in the History of Present Illness. Objective:       Physical Exam:  HEENT: Normocephalic, atraumatic  Lungs:  Clear to auscultation  Heart:   Regular rate and rhythm  Abdomen: Soft  Extremities:  Pain with range of motion of the left hip. Passive flexion  degrees,                       passive internal rotation 0-10 degrees, with pain throughout ROM,                        passive external rotation 10-20 degrees with pain at the arc of motion. Antalgic gait noted. Assessment:      Arthritis of the left hip. Plan:       Proceed with scheduled LEFT TOTAL HIP ARTHROPLASTY. The various methods of treatment have been discussed with the patient and family. After consideration of risks, benefits, and other options for treatment, the patient has consented to surgical interventions. Questions were answered and preoperative teaching was done by Dr Jacki Becerril.      Signed By: EMMA Pradhan     December 9, 2020

## 2020-12-10 NOTE — DISCHARGE SUMMARY
[address] [address]     DISCHARGE SUMMARY     PATIENT: John Ivey     MRN: [id number]   ADMIT DATE: 12/10/2020   BILLIN   DISCHARGE DATE: 12/10/2020     ATTENDING: Deisy Davis MD   DICTATING: EMMA Mena     ADMISSION DIAGNOSIS: Osteoarthritis of left hip [M16.12]    DISCHARGE DIAGNOSIS: Status post LEFT TOTAL HIP ARTHROPLASTY    HISTORY OF PRESENT ILLNESS: The patient is a 60y.o. year-old male   with ongoing left hip pain secondary to osteoarthritis. The patient's pain has persisted and progressed despite conservative treatments and therapies. The patient has at this time opted for surgical intervention. PAST MEDICAL HISTORY:   Past Medical History:   Diagnosis Date    Arthritis        PAST SURGICAL HISTORY:   Past Surgical History:   Procedure Laterality Date    HX KNEE ARTHROSCOPY  left    HX OTHER SURGICAL Right     collar bone surgery    HX OTHER SURGICAL  2019    keratosis removed from scalp    HX REFRACTIVE SURGERY      HX THORACIC LAMINECTOMY      HX TONSILLECTOMY      HX WISDOM TEETH EXTRACTION         ALLERGIES: No Known Allergies     CURRENT MEDICATIONS:  A list of medications prior to the time of admission include:  Prior to Admission medications    Medication Sig Start Date End Date Taking? Authorizing Provider   OTHER daily as needed. Yes Provider, Historical   aspirin/acetaminophen/caffeine (MIGRAINE PO) Take  by mouth daily as needed. Yes Provider, Historical   aspirin delayed-release 81 mg tablet Take 1 Tab by mouth two (2) times a day for 21 days. 12/10/20 12/31/20 Yes Priti Miller PA   meloxicam (Mobic) 7.5 mg tablet Take 1 Tab by mouth two (2) times a day for 14 days. 12/10/20 12/24/20 Yes Priti Miller PA   acetaminophen (Tylenol Arthritis Pain) 650 mg TbER Take 650 mg by mouth every eight (8) hours.    Yes Provider, Historical   multivitamin (ONE A DAY) tablet Take 1 Tab by mouth daily. Yes Provider, Historical       FAMILY HISTORY: History reviewed. No pertinent family history. SOCIAL HISTORY:   Social History     Socioeconomic History    Marital status:      Spouse name: Not on file    Number of children: Not on file    Years of education: Not on file    Highest education level: Not on file   Tobacco Use    Smoking status: Former Smoker     Quit date:      Years since quittin.9    Smokeless tobacco: Never Used   Substance and Sexual Activity    Alcohol use: Yes     Alcohol/week: 7.0 standard drinks     Types: 7 Cans of beer per week     Comment: 7 beers a week    Drug use: Not Currently       REVIEW OF SYSTEMS: All review of systems are negative. PHYSICAL EXAMINATION: For a detailed physical exam, please refer to the patient's chart. HOSPITAL COURSE: The patient was taken to surgery the day of admission. he underwent left total hip replacement via the anterior approach. Operative course was benign. Estimated blood loss approximately 300 cc. The patient was taken to the PACU in stable condition and was later taken to the floor in stable condition. Post Op Day #0, the patient has done very well.  he has had little to no pain. he had been cleared by physical therapy with stair training. he was placed on Aspirin for DVT prophylaxis. his vitals have remained stable. he has also remained hemodynamically stable. The patient has been recommended for discharge home. DISCHARGE INSTRUCTIONS: The patient is to be discharged home. Discharge Medication List as of 12/10/2020  3:08 PM      START taking these medications    Details   aspirin delayed-release 81 mg tablet Take 1 Tab by mouth two (2) times a day for 21 days. , Normal, Disp-42 Tab,R-0      meloxicam (Mobic) 7.5 mg tablet Take 1 Tab by mouth two (2) times a day for 14 days. , Normal, Disp-28 Tab,R-0      oxyCODONE IR (ROXICODONE) 5 mg immediate release tablet Take 1 Tab by mouth every four (4) hours as needed for Pain for up to 7 days. Max Daily Amount: 30 mg., Normal, Disp-42 Tab,R-0      cefadroxil (DURICEF) 500 mg capsule Take 1 Cap by mouth two (2) times a day for 5 days. , Normal, Disp-10 Cap,R-0         CONTINUE these medications which have NOT CHANGED    Details   OTHER daily as needed., Historical Med      aspirin/acetaminophen/caffeine (MIGRAINE PO) Take  by mouth daily as needed., Historical Med      acetaminophen (Tylenol Arthritis Pain) 650 mg TbER Take 650 mg by mouth every eight (8) hours. , Historical Med      multivitamin (ONE A DAY) tablet Take 1 Tab by mouth daily. , Historical Med         STOP taking these medications       naproxen sodium (Aleve) 220 mg tablet Comments:   Reason for Stopping:                   The patient is to continue at home with home physical therapy 3 times a week to work on gait training, range of motion, strengthening, and weightbearing exercises as tolerated on his left lower extremity. The patient is to progress from a walker to a cane to complete total weightbearing as tolerable. The patient is to continue to keep his incision dry. The patient is to followup with Dr. Haley Bellamy, [facility] PA-C, and/or [facility] PA-C in the office approximately 10-14 days status post for x-rays and further evaluation.       EMMA Hassan  12/20/2020

## 2020-12-11 ENCOUNTER — TELEPHONE (OUTPATIENT)
Dept: OTHER | Age: 65
End: 2020-12-11

## 2020-12-11 ENCOUNTER — HOME CARE VISIT (OUTPATIENT)
Dept: SCHEDULING | Facility: HOME HEALTH | Age: 65
End: 2020-12-11
Payer: COMMERCIAL

## 2020-12-11 VITALS
HEART RATE: 90 BPM | OXYGEN SATURATION: 97 % | DIASTOLIC BLOOD PRESSURE: 80 MMHG | TEMPERATURE: 97.2 F | SYSTOLIC BLOOD PRESSURE: 120 MMHG | RESPIRATION RATE: 16 BRPM

## 2020-12-11 PROCEDURE — G0151 HHCP-SERV OF PT,EA 15 MIN: HCPCS

## 2020-12-11 PROCEDURE — 400013 HH SOC

## 2020-12-11 PROCEDURE — A6213 FOAM DRG >16<=48 SQ IN W/BDR: HCPCS

## 2020-12-11 PROCEDURE — G0299 HHS/HOSPICE OF RN EA 15 MIN: HCPCS

## 2020-12-12 ENCOUNTER — HOME CARE VISIT (OUTPATIENT)
Dept: SCHEDULING | Facility: HOME HEALTH | Age: 65
End: 2020-12-12
Payer: COMMERCIAL

## 2020-12-12 VITALS
DIASTOLIC BLOOD PRESSURE: 68 MMHG | TEMPERATURE: 97.9 F | HEART RATE: 80 BPM | SYSTOLIC BLOOD PRESSURE: 126 MMHG | OXYGEN SATURATION: 98 %

## 2020-12-12 PROCEDURE — G0157 HHC PT ASSISTANT EA 15: HCPCS

## 2020-12-14 ENCOUNTER — HOME CARE VISIT (OUTPATIENT)
Dept: SCHEDULING | Facility: HOME HEALTH | Age: 65
End: 2020-12-14
Payer: COMMERCIAL

## 2020-12-14 ENCOUNTER — HOME CARE VISIT (OUTPATIENT)
Dept: HOME HEALTH SERVICES | Facility: HOME HEALTH | Age: 65
End: 2020-12-14
Payer: COMMERCIAL

## 2020-12-14 VITALS
RESPIRATION RATE: 18 BRPM | SYSTOLIC BLOOD PRESSURE: 128 MMHG | TEMPERATURE: 98.8 F | DIASTOLIC BLOOD PRESSURE: 84 MMHG | HEART RATE: 82 BPM | OXYGEN SATURATION: 98 %

## 2020-12-14 VITALS
TEMPERATURE: 97 F | OXYGEN SATURATION: 98 % | HEART RATE: 88 BPM | SYSTOLIC BLOOD PRESSURE: 131 MMHG | RESPIRATION RATE: 18 BRPM | DIASTOLIC BLOOD PRESSURE: 74 MMHG

## 2020-12-14 PROCEDURE — G0157 HHC PT ASSISTANT EA 15: HCPCS

## 2020-12-16 ENCOUNTER — HOME CARE VISIT (OUTPATIENT)
Dept: SCHEDULING | Facility: HOME HEALTH | Age: 65
End: 2020-12-16
Payer: COMMERCIAL

## 2020-12-16 PROCEDURE — G0157 HHC PT ASSISTANT EA 15: HCPCS

## 2020-12-17 ENCOUNTER — HOME CARE VISIT (OUTPATIENT)
Dept: SCHEDULING | Facility: HOME HEALTH | Age: 65
End: 2020-12-17
Payer: COMMERCIAL

## 2020-12-17 VITALS
TEMPERATURE: 97.3 F | RESPIRATION RATE: 18 BRPM | OXYGEN SATURATION: 97 % | SYSTOLIC BLOOD PRESSURE: 135 MMHG | DIASTOLIC BLOOD PRESSURE: 73 MMHG | HEART RATE: 74 BPM

## 2020-12-17 PROCEDURE — G0299 HHS/HOSPICE OF RN EA 15 MIN: HCPCS

## 2020-12-18 ENCOUNTER — HOME CARE VISIT (OUTPATIENT)
Dept: SCHEDULING | Facility: HOME HEALTH | Age: 65
End: 2020-12-18
Payer: COMMERCIAL

## 2020-12-18 PROCEDURE — G0157 HHC PT ASSISTANT EA 15: HCPCS

## 2020-12-19 VITALS
DIASTOLIC BLOOD PRESSURE: 66 MMHG | OXYGEN SATURATION: 97 % | TEMPERATURE: 97.3 F | RESPIRATION RATE: 18 BRPM | SYSTOLIC BLOOD PRESSURE: 138 MMHG | HEART RATE: 67 BPM

## 2020-12-21 ENCOUNTER — HOME CARE VISIT (OUTPATIENT)
Dept: SCHEDULING | Facility: HOME HEALTH | Age: 65
End: 2020-12-21
Payer: COMMERCIAL

## 2020-12-21 PROCEDURE — G0157 HHC PT ASSISTANT EA 15: HCPCS

## 2020-12-22 VITALS
OXYGEN SATURATION: 98 % | TEMPERATURE: 97.8 F | RESPIRATION RATE: 18 BRPM | DIASTOLIC BLOOD PRESSURE: 67 MMHG | SYSTOLIC BLOOD PRESSURE: 127 MMHG | HEART RATE: 72 BPM

## 2020-12-23 ENCOUNTER — HOME CARE VISIT (OUTPATIENT)
Dept: SCHEDULING | Facility: HOME HEALTH | Age: 65
End: 2020-12-23
Payer: COMMERCIAL

## 2020-12-23 VITALS
DIASTOLIC BLOOD PRESSURE: 78 MMHG | RESPIRATION RATE: 18 BRPM | TEMPERATURE: 98.7 F | OXYGEN SATURATION: 97 % | SYSTOLIC BLOOD PRESSURE: 140 MMHG

## 2020-12-23 PROCEDURE — G0157 HHC PT ASSISTANT EA 15: HCPCS

## 2020-12-23 PROCEDURE — G0299 HHS/HOSPICE OF RN EA 15 MIN: HCPCS

## 2020-12-25 ENCOUNTER — HOME CARE VISIT (OUTPATIENT)
Dept: HOME HEALTH SERVICES | Facility: HOME HEALTH | Age: 65
End: 2020-12-25
Payer: COMMERCIAL

## 2020-12-27 VITALS
RESPIRATION RATE: 16 BRPM | HEART RATE: 72 BPM | SYSTOLIC BLOOD PRESSURE: 122 MMHG | DIASTOLIC BLOOD PRESSURE: 80 MMHG | OXYGEN SATURATION: 97 % | TEMPERATURE: 97.5 F

## 2020-12-28 ENCOUNTER — HOME CARE VISIT (OUTPATIENT)
Dept: SCHEDULING | Facility: HOME HEALTH | Age: 65
End: 2020-12-28
Payer: COMMERCIAL

## 2020-12-28 PROCEDURE — G0151 HHCP-SERV OF PT,EA 15 MIN: HCPCS

## 2020-12-29 VITALS
OXYGEN SATURATION: 98 % | SYSTOLIC BLOOD PRESSURE: 146 MMHG | HEART RATE: 92 BPM | RESPIRATION RATE: 18 BRPM | DIASTOLIC BLOOD PRESSURE: 92 MMHG | TEMPERATURE: 97.5 F

## 2020-12-29 VITALS
SYSTOLIC BLOOD PRESSURE: 128 MMHG | DIASTOLIC BLOOD PRESSURE: 80 MMHG | HEART RATE: 78 BPM | TEMPERATURE: 97.6 F | OXYGEN SATURATION: 98 % | RESPIRATION RATE: 16 BRPM

## 2021-01-07 ENCOUNTER — HOSPITAL ENCOUNTER (OUTPATIENT)
Dept: PHYSICAL THERAPY | Age: 66
Discharge: HOME | End: 2021-01-07
Payer: COMMERCIAL

## 2021-01-07 PROCEDURE — 97110 THERAPEUTIC EXERCISES: CPT

## 2021-01-07 PROCEDURE — 97535 SELF CARE MNGMENT TRAINING: CPT

## 2021-01-07 PROCEDURE — 97161 PT EVAL LOW COMPLEX 20 MIN: CPT

## 2021-01-07 NOTE — PROGRESS NOTES
[facility] [address]  [phone] fax    Plan of Care/ Statement of Necessity for Physical Therapy Services  Patient name: Chen Diamond Start of Care: 2021   Referral source: Jaison Villalobos MD : 1955    Medical Diagnosis: Left hip pain [M25.552]  Payor: Delma Driver / Plan: Elizabet Gallardo HMO / Product Type: HMO /  Onset Date: DOS 12/10/2020    Treatment Diagnosis: left hip pain   Prior Hospitalization: see medical history Provider#: [id number]   Medications: Verified on Patient summary List    Comorbidities: hx left PCL tear in knee in , arthritis in right hip. Prior Level of Function: Independent with ADLs, functional, and work tasks with increased pain in the left hip. The Plan of Care and following information is based on the information from the initial evaluation. Assessment/ key information:   Pt is a 60year old male who presents to therapy today with left hip pain s/p left anterior THR DOS 12/10/2020. Pt denies having any complications with the surgery and had home health therapy after. Pt presents to therapy today with [facility] and states he is using it for prolonged and community distances. Pt demonstrated decreased AROM, decreased strength, muscle tightness, and impaired balance/gait. Scar seems to be healing well with no signs/symptoms of infection or DVT. Pt would benefit from physical therapy to improve the above impairments to help the pt return to performing ADLs, functional and recreational activities.      Evaluation Complexity History MEDIUM  Complexity : 1-2 comorbidities / personal factors will impact the outcome/ POC ; Examination HIGH Complexity : 4+ Standardized tests and measures addressing body structure, function, activity limitation and / or participation in recreation  ;Presentation LOW Complexity : Stable, uncomplicated  ;Clinical Decision Making MEDIUM Complexity : FOTO score of 26-74  Overall Complexity Rating: LOW   Problem List: pain affecting function, decrease ROM, decrease strength, edema affecting function, impaired gait/ balance, decrease ADL/ functional abilitiies, decrease activity tolerance, decrease flexibility/ joint mobility and decrease transfer abilities   Treatment Plan may include any combination of the following: Therapeutic exercise, Therapeutic activities, Neuromuscular re-education, Physical agent/modality, Gait/balance training, Manual therapy, Patient education, Self Care training, Functional mobility training, Home safety training and Stair training  Patient / Family readiness to learn indicated by: asking questions, trying to perform skills and interest  Persons(s) to be included in education: patient (P)  Barriers to Learning/Limitations: None  Patient Goal (s): return to normal  Patient Self Reported Health Status: excellent  Rehabilitation Potential: excellent    Short Term Goals: To be accomplished in 2 treatments:  1. Pt will report compliance and independence to HEP to help the pt manage their pain and symptoms. Eval: established   Long Term Goals: To be accomplished in 10 treatments:  1. Pt will increase FOTO score to 76 points to improve ability to perform ADLs. Eval: 69 points  2. Pt will increase MMT left hip ABD/flex to 4+/5 to improve ability to tolerate stairs. Eval: 4-/5, mild pain with flex  3. Pt will increase AROM left hip flex to 120 degs to improve ability to performance of work tasks. Eval: 108 degs in supine. 4. Pt will be able to ambulate 100 ft in the clinic with no AD, no trendelenburg, no LOB or assist to improve ability to return to performing recreational activities. Eval: uses SPC with mild trendelenburg, no LOB or assist.     Frequency / Duration: Patient to be seen 1-2 times per week for 10 treatments.     Patient/ Caregiver education and instruction: Diagnosis, prognosis, self care, activity modification and exercises   [x] Plan of care has been reviewed with IQRA Woodard, PT 1/7/2021 10:56 AM  _____________________________________________________________________  I certify that the above Therapy Services are being furnished while the patient is under my care. I agree with the treatment plan and certify that this therapy is necessary.     Physician's Signature:____________________  Date:__________Time:______    Please sign and return to [facility] [address] [facility] [address]  [facility]  [phone] fax

## 2021-01-07 NOTE — PROGRESS NOTES
PT DAILY TREATMENT NOTE  10-18    Patient Name: Rachael Lopez  Date:2021  : 1955  [x]  Patient  Verified  Payor: Mae Pratt / Plan: [employer] H[employer] / Product Type: HMO /    In time:10:35  Out time:11:19  Total Treatment Time (min): 44  Visit #: 1 of 10    Treatment Area: Left hip pain [M25.552]    SUBJECTIVE  Pain Level (0-10 scale): 0-1  Any medication changes, allergies to medications, adverse drug reactions, diagnosis change, or new procedure performed?: [x] No    [] Yes (see summary sheet for update)  Subjective functional status/changes:   [] No changes reported  See POC    OBJECTIVE    20 min [x]Eval                  []Re-Eval     10 min Therapeutic Exercise:  [] See flow sheet : HEP instruction and demonstration   Rationale: increase ROM and increase strength to improve the patients ability to tolerate ADLs    14 min Self Care/Home Management: []  See flow sheet : pt education regarding anatomy and physiology of the LEs and how it relates to the pt's condition, pt education on signs/symptoms of DVT/infection, pt education on avoiding starting higher level recreational activities until he gains more strength/AROM and not until after we try some higher level exercises in the clinic first when appropriate. Rationale: increase ROM, increase strength and decrease pain/symptoms  to improve the patients ability to tolerate functional tasks. With   [x] TE   [] TA   [] neuro   [x] Other: Self Care/Home management Patient Education: [x] Review HEP    [] Progressed/Changed HEP based on:   [] positioning   [] body mechanics   [] transfers   [] heat/ice application    [] other:      Other Objective/Functional Measures: See evaluation. Pain Level (0-10 scale) post treatment: 0-1    ASSESSMENT/Changes in Function: Pt given HEP handout to perform. Pt understood exercises in HEP handout. Pt demonstrated decreased AROM, decreased strength, muscle tightness, and impaired balance/gait.  Scar seems to be healing well with no signs/symptoms of infection or DVT. Pt would benefit from physical therapy to improve the above impairments to help the pt return to performing ADLs, functional and recreational activities. Patient will continue to benefit from skilled PT services to modify and progress therapeutic interventions, address functional mobility deficits, address ROM deficits, address strength deficits, analyze and address soft tissue restrictions, analyze and cue movement patterns, analyze and modify body mechanics/ergonomics, assess and modify postural abnormalities, address imbalance/dizziness and instruct in home and community integration to attain remaining goals. [x]  See Plan of Care  []  See progress note/recertification  []  See Discharge Summary         Progress towards goals / Updated goals:  Short Term Goals: To be accomplished in 2 treatments:  1. Pt will report compliance and independence to HEP to help the pt manage their pain and symptoms. Eval: established   Long Term Goals: To be accomplished in 10 treatments:  1. Pt will increase FOTO score to 76 points to improve ability to perform ADLs. Eval: 69 points  2. Pt will increase MMT left hip ABD/flex to 4+/5 to improve ability to tolerate stairs. Eval: 4-/5, mild pain with flex  3. Pt will increase AROM left hip flex to 120 degs to improve ability to performance of work tasks. Eval: 108 degs in supine. 4. Pt will be able to ambulate 100 ft in the clinic with no AD, no trendelenburg, no LOB or assist to improve ability to return to performing recreational activities. Eval: uses SPC with mild trendelenburg, no LOB or assist.     PLAN  [x]  Upgrade activities as tolerated     [x]  Continue plan of care  [x]  Update interventions per flow sheet       []  Discharge due to:_  []  Other:_      Mahnaz Hernandez, PT 1/7/2021  10:56 AM    No future appointments.

## 2021-01-14 ENCOUNTER — HOSPITAL ENCOUNTER (OUTPATIENT)
Dept: PHYSICAL THERAPY | Age: 66
Discharge: HOME | End: 2021-01-14
Payer: COMMERCIAL

## 2021-01-14 PROCEDURE — 97112 NEUROMUSCULAR REEDUCATION: CPT

## 2021-01-14 PROCEDURE — 97110 THERAPEUTIC EXERCISES: CPT

## 2021-01-14 NOTE — PROGRESS NOTES
PT DAILY TREATMENT NOTE     Patient Name: David Hannon  Date:2021  : 1955  [x]  Patient  Verified  Payor: Dinora Mclaughlin / Plan: [place] [employer] / Product Type: HMO /    In time:730  Out time:800  Total Treatment Time (min): 30  Visit #: 2 of 10    Treatment Area: Left hip pain [M25.552]    SUBJECTIVE  Pain Level (0-10 scale): 1/10  Any medication changes, allergies to medications, adverse drug reactions, diagnosis change, or new procedure performed?: [x] No    [] Yes (see summary sheet for update)  Subjective functional status/changes:   [] No changes reported  Pt stated that he is doing well today and has more stiffness than pain    OBJECTIVE    22 min Therapeutic Exercise:  [x] See flow sheet :   Rationale: increase ROM and increase strength to improve the patients ability to increase ease with ADLs    8 min Neuromuscular Re-education:  [x]  See flow sheet :static balance, band walks and core exercises   Rationale: increase strength, improve coordination, improve balance and increase proprioception  to improve the patients ability to increase core strength for improved posture and stability with walking    With   [x] TE   [] TA   [] neuro   [] other: Patient Education: [x] Review HEP    [] Progressed/Changed HEP based on:   [] positioning   [] body mechanics   [] transfers   [] heat/ice application    [] other:      Other Objective/Functional Measures:   No difficulty with exercises  GTB was issued for home use   No complaint of pain during session   Dead bugs were challenging     Pain Level (0-10 scale) post treatment: 0/10    ASSESSMENT/Changes in Function:   Initiated therex today per flow sheet. Pt put forth good effort with all exercises.  Pt reported compliance with HEP    Patient will continue to benefit from skilled PT services to modify and progress therapeutic interventions, address functional mobility deficits, address ROM deficits, address strength deficits, analyze and cue movement patterns, analyze and modify body mechanics/ergonomics, assess and modify postural abnormalities and instruct in home and community integration to attain remaining goals. [x]  See Plan of Care  []  See progress note/recertification  []  See Discharge Summary         Progress towards goals / Updated goals:  Short Term Goals: To be accomplished in 2 treatments:  1. Pt will report compliance and independence to [facility] to help the pt manage their pain and symptoms. Goal met. 1/14/21    Long Term Goals: To be accomplished in 10 treatments:  1. Pt will increase FOTO score to 76 points to improve ability to perform ADLs. Eval: 69 points  2. Pt will increase MMT left hip ABD/flex to 4+/5 to improve ability to tolerate stairs. Eval: 4-/5, mild pain with flex  3. Pt will increase AROM left hip flex to 120 degs to improve ability to performance of work tasks. Eval: 108 degs in supine. 4. Pt will be able to ambulate 100 ft in the clinic with no AD, no trendelenburg, no LOB or assist to improve ability to return to performing recreational activities.    Eval: uses SPC with mild trendelenburg, no LOB or assist.     PLAN  []  Upgrade activities as tolerated     [x]  Continue plan of care  []  Update interventions per flow sheet       []  Discharge due to:_  []  Other:_      Lizz Doshi, IQRA 1/14/2021  6:35 AM    Future Appointments   Date Time Provider Cristina Avila   1/14/2021  7:30 AM Kristin Manning, PTA [facility]   1/18/2021  6:00 PM Marj Max, PT [facility]   1/20/2021  5:15 PM Marjshnanon Max, PT [facility]   1/25/2021  6:00 PM Marj Max, PT [facility]   1/27/2021  6:00 PM Francy Carranza, PT [facility]   2/1/2021  6:00 PM Marj Max, PT [facility]   2/4/2021  6:00 PM Marj Fontan, PT [facility]   2/8/2021  6:00 PM Marj Fontan, PT [facility]   2/11/2021  6:00 PM Marj Fontan, PT [facility]   2/15/2021  6:00 PM Zen Brasher, PT [facility]

## 2021-01-18 ENCOUNTER — HOSPITAL ENCOUNTER (OUTPATIENT)
Dept: PHYSICAL THERAPY | Age: 66
Discharge: HOME | End: 2021-01-18
Payer: COMMERCIAL

## 2021-01-18 PROCEDURE — 97110 THERAPEUTIC EXERCISES: CPT

## 2021-01-18 PROCEDURE — 97112 NEUROMUSCULAR REEDUCATION: CPT

## 2021-01-18 NOTE — PROGRESS NOTES
PT DAILY TREATMENT NOTE     Patient Name: Hoang Read  Date:2021  : 1955  [x]  Patient  Verified  Payor: Mariana Sandhu / Plan: [employer] / Product Type: HMO /    In time:6:02  Out time:6:48  Total Treatment Time (min): 55  Visit #: 3 of 10    Medicare/BCBS Only   Total Timed Codes (min):  46 1:1 Treatment Time:  46       Treatment Area: Left hip pain [M25.552]    SUBJECTIVE  Pain Level (0-10 scale): 0/10  Any medication changes, allergies to medications, adverse drug reactions, diagnosis change, or new procedure performed?: [x] No    [] Yes (see summary sheet for update)  Subjective functional status/changes:   [] No changes reported  Pt was pleasant, no complaints or discomfort     OBJECTIVE      30 min Therapeutic Exercise:  [] See flow sheet : LE strengthening    Rationale: increase strength to improve the patients ability to carry out ADL's     16 min Neuromuscular Re-education:  []  See flow sheet : SLS, altered surface    Rationale: increase strength, improve coordination and improve balance  to improve the patients ability to carry out ADL's          With   [x] TE   [] TA   [] neuro   [] other: Patient Education: [x] Review HEP    [x] Progressed/Changed HEP based on:   [x] positioning   [x] body mechanics   [] transfers   [] heat/ice application    [] other:      Other Objective/Functional Measures:   Increase 3 way hip with #2 weight. Added monster walks with banded walks, pt responded well    Added SLS with foam pad reaching out with opposite foot to cones. Pain Level (0-10 scale) post treatment: 1/10    ASSESSMENT/Changes in Function:   Pt was pleasant and showed great motivation to increase function. Overall, pt is doing very well, reported walking up >20 steps at work, being very compliment with HEP. Patient will continue to benefit from skilled PT services to modify and progress therapeutic interventions and address functional mobility deficits to attain remaining goals. Progress towards goals / Updated goals:  1. Pt will increase FOTO score to 76 points to improve ability to perform ADLs. Eval: 69 points  2. Pt will increase MMT left hip ABD/flex to 4+/5 to improve ability to tolerate stairs. Eval: 4-/5, mild pain with flex  3. Pt will increase AROM left hip flex to 120 degs to improve ability to performance of work tasks. Eval: 108 degs in supine. 4. Pt will be able to ambulate 100 ft in the clinic with no AD, no trendelenburg, no LOB or assist to improve ability to return to performing recreational activities.    Eval: uses SPC with mild trendelenburg, no LOB or assist.   MET, pt shows no gait abnormalities and is walking >100 ft with no AD. (1/18/21)       PLAN  []  Upgrade activities as tolerated     [x]  Continue plan of care  []  Update interventions per flow sheet       []  Discharge due to:_  []  Other:_      Author Rozet 1/18/2021  6:01 PM    Future Appointments   Date Time Provider Cristina Avila   1/20/2021  5:15 PM Robert , PT [facility]   1/25/2021  6:00 PM Robert , PT [facility]   1/27/2021  6:00 PM Francy Rodriguez Ee, PT [facility]   2/1/2021  6:00 PM Robert , PT [facility]   2/4/2021  6:00 PM Robert , PT [facility]   2/8/2021  6:00 PM Robert , PT [facility]   2/11/2021  6:00 PM Robert , PT [facility]   2/15/2021  6:00 PM Robert , PT [facility]

## 2021-01-20 ENCOUNTER — HOSPITAL ENCOUNTER (OUTPATIENT)
Dept: PHYSICAL THERAPY | Age: 66
Discharge: HOME | End: 2021-01-20
Payer: COMMERCIAL

## 2021-01-20 PROCEDURE — 97112 NEUROMUSCULAR REEDUCATION: CPT

## 2021-01-20 PROCEDURE — 97110 THERAPEUTIC EXERCISES: CPT

## 2021-01-20 NOTE — PROGRESS NOTES
PT DAILY TREATMENT NOTE     Patient Name: Bronson Molina  LYDY:  : 1955  [x]  Patient  Verified  Payor: Terry Human / Plan: [address] [place] [address] [place] [employer] / Product Type: HMO /    In time: 5:17  Out time: 5:55  Total Treatment Time (min): 38  Visit #: 4 of 10    Treatment Area: Left hip pain [M25.552]    SUBJECTIVE  Pain Level (0-10 scale): 0/10  Any medication changes, allergies to medications, adverse drug reactions, diagnosis change, or new procedure performed?: [x] No    [] Yes (see summary sheet for update)  Subjective functional status/changes:   [] No changes reported  Pt reports he's been doing very well. He will follow up with MD on 21. OBJECTIVE    28 min Therapeutic Exercise:  [x] See flow sheet :   Rationale: increase ROM and increase strength to improve the patients ability to amb perform ADLs with more ease    10 min Neuromuscular Re-education:  [x]  See flow sheet :   Rationale: improve coordination, improve balance and increase proprioception  to improve the patients ability to amb and perform ADLs safely and comfortably          With   [] TE   [] TA   [] neuro   [] other: Patient Education: [x] Review HEP    [] Progressed/Changed HEP based on:   [] positioning   [] body mechanics   [] transfers   [] heat/ice application    [] other:      Other Objective/Functional Measures: Mod cramping of B HS with bridges on SB    Good form with all therex and good tolerance with increased reps and weight    Min fatigued with strengthening therex using machine    Pain Level (0-10 scale) post treatment: 0/10    ASSESSMENT/Changes in Function: Pt made great progress overall. He amb with no AD, demonstrates good SL stability and good tolerance with progressive strengthening therex. Min limitation with flex AROM noted. Will cont to progress strengthening therex and stability training activities as tolerated.      Patient will continue to benefit from skilled PT services to modify and progress therapeutic interventions, address functional mobility deficits, address ROM deficits, address strength deficits, analyze and address soft tissue restrictions, analyze and cue movement patterns, analyze and modify body mechanics/ergonomics, assess and modify postural abnormalities, address imbalance/dizziness and instruct in home and community integration to attain remaining goals. [x]  See Plan of Care  []  See progress note/recertification  []  See Discharge Summary         Progress towards goals / Updated goals:  1. Pt will increase FOTO score to 76 points to improve ability to perform ADLs. Eval: 69 points    2. Pt will increase MMT left hip ABD/flex to 4+/5 to improve ability to tolerate stairs. Eval: 4-/5, mild pain with flex    3. Pt will increase AROM left hip flex to 120 degs to improve ability to performance of work tasks. Eval: 108 degs in supine. 4. Pt will be able to ambulate 100 ft in the clinic with no AD, no trendelenburg, no LOB or assist to improve ability to return to performing recreational activities.    Eval: uses SPC with mild trendelenburg, no LOB or assist.   MET, pt shows no gait abnormalities and is walking >100 ft with no AD. (1/18/21)    PLAN  [x]  Upgrade activities as tolerated     [x]  Continue plan of care  []  Update interventions per flow sheet       []  Discharge due to:_  []  Other:_      Alma Dyer, PT 1/20/2021  8:50 AM    Future Appointments   Date Time Provider Cristina Avila   1/20/2021  5:15 PM Javier Landis [facility]   1/25/2021  6:00 PM Bert Alfred PT [facility]   1/27/2021  6:00 PM Francy Driscoll, [address] [facility]   2/1/2021  6:00 PM Bert Alfred PT [facility]   2/4/2021  6:00 PM Bert Alfred PT [facility]   2/8/2021  6:00 PM Bert Alfred PT [facility]   2/11/2021  6:00 PM Bret Alfred, PT [facility]   2/15/2021  6:00 PM Bert Alfred, PT [facility]

## 2021-01-27 ENCOUNTER — HOSPITAL ENCOUNTER (OUTPATIENT)
Dept: PHYSICAL THERAPY | Age: 66
Discharge: HOME | End: 2021-01-27
Payer: COMMERCIAL

## 2021-01-27 PROCEDURE — 97110 THERAPEUTIC EXERCISES: CPT

## 2021-01-27 NOTE — PROGRESS NOTES
PT DAILY TREATMENT NOTE     Patient Name: Anny Mcpherson  OVGO:[id number]  : 1955  [x]  Patient  Verified  Payor: Rakan Ovalle / Plan: [employer] / Product Type: HMO /    In time:6:00P  Out time:6:40P  Total Treatment Time (min): 40  Visit #: 6 of 10      Treatment Area: Left hip pain [M25.552]    SUBJECTIVE  Pain Level (0-10 scale): 0/10  Any medication changes, allergies to medications, adverse drug reactions, diagnosis change, or new procedure performed?: [x] No    [] Yes (see summary sheet for update)  Subjective functional status/changes:   [] No changes reported    Patient reports he felt great during and following last session. He had no pain. His quads are sore today. OBJECTIVE      40 min Therapeutic Exercise:  [x] See flow sheet :   Rationale: increase ROM, increase strength, improve coordination and improve balance to improve the patients ability to perform functional tasks with increased ease        With   [] TE   [] TA   [] neuro   [] other: Patient Education: [x] Review HEP    [] Progressed/Changed HEP based on:   [] positioning   [] body mechanics   [] transfers   [] heat/ice application    [] other:      Other Objective/Functional Measures:     No LOB with SLS  Patient reported left knee pain with TRX squats which was alleviated with repetition  Patient reported continued challenge with leg press  Added seated hamstring stretch  Increased reps with sit to stands with elizabeth bell      Pain Level (0-10 scale) post treatment: 0/10    ASSESSMENT/Changes in Function: Patient progressing well towards goals. He remains motivated with therapy session. Positive subjective response to seated hamstring stretch. He reports the \"dead bugs\" are the most challenging activity.      Patient will continue to benefit from skilled PT services to modify and progress therapeutic interventions, address functional mobility deficits, address ROM deficits, address strength deficits, analyze and address soft tissue restrictions, analyze and cue movement patterns, analyze and modify body mechanics/ergonomics, assess and modify postural abnormalities and address imbalance/dizziness to attain remaining goals. Progress towards goals / Updated goals:  1. Pt will increase FOTO score to 76 points to improve ability to perform ADLs. Eval: 69 points     2. Pt will increase MMT left hip ABD/flex to 4+/5 to improve ability to tolerate stairs. Eval: 4-/5, mild pain with flex  Not Met: 4+/5 hip abd, 4-/5 hip flexion (1/25/21)     3. Pt will increase AROM left hip flex to 120 degs to improve ability to performance of work tasks. Eval: 108 degs in supine. Not Met: 95* in supine. (1/25/21)     4. Pt will be able to ambulate 100 ft in the clinic with no AD, no trendelenburg, no LOB or assist to improve ability to return to performing recreational activities.    Eval: uses SPC with mild trendelenburg, no LOB or assist.   MET, pt shows no gait abnormalities and is walking >100 ft with no AD. (1/18/21)    PLAN  []  Upgrade activities as tolerated     [x]  Continue plan of care  []  Update interventions per flow sheet       []  Discharge due to:_  []  Other:_      Erick Gandhi, PT 1/27/2021  2:38 PM    Future Appointments   Date Time Provider Cristina Avila   1/27/2021  6:00 [place] [facility]   2/1/2021  6:00 PM Daljit Pena PT [facility]   2/4/2021  6:00 PM Daljit Pena PT [facility]   2/8/2021  6:00 PM Daljit Pena PT [facility]   2/11/2021  6:00 PM Daljit Pena PT [facility]   2/15/2021  6:00 PM Daljit Pena PT [facility]

## 2021-02-01 ENCOUNTER — HOSPITAL ENCOUNTER (OUTPATIENT)
Dept: PHYSICAL THERAPY | Age: 66
Discharge: HOME | End: 2021-02-01
Payer: COMMERCIAL

## 2021-02-01 PROCEDURE — 97112 NEUROMUSCULAR REEDUCATION: CPT

## 2021-02-01 PROCEDURE — 97110 THERAPEUTIC EXERCISES: CPT

## 2021-02-01 NOTE — PROGRESS NOTES
PT DAILY TREATMENT NOTE     Patient Name: Loree Arroyo  EMH:[id number]  : 1955  [x]  Patient  Verified  Payor: Ashish Brown / Plan: [employer] / Product Type: HMO /    In time:6:00  Out time:6:34  Total Treatment Time (min): 34  Visit #: 7 of 10    Treatment Area: Left hip pain [M25.552]    SUBJECTIVE  Pain Level (0-10 scale): 0/10  Any medication changes, allergies to medications, adverse drug reactions, diagnosis change, or new procedure performed?: [x] No    [] Yes (see summary sheet for update)  Subjective functional status/changes:   [] No changes reported  Pt reported he feels really good, thinking about discontinuing     OBJECTIVE    24 min Therapeutic Exercise:  [x] See flow sheet : Hip strengthening, squats   Rationale: increase ROM, increase strength, improve coordination and improve balance to improve the patients ability to carry out ADL's                10 min Neuromuscular Re-education:  [x]? See flow sheet : single leg stance with perturbations. Rationale: improve coordination, improve balance and increase proprioception  to improve the patients ability to amb and perform ADLs safely and comfortably                                                          With   [] TE   [] TA   [] neuro   [] other: Patient Education: [x] Review HEP    [] Progressed/Changed HEP based on:   [] positioning   [] body mechanics   [] transfers   [] heat/ice application    [] other:      Other Objective/Functional Measures:   Banded shuffle, BTB around knees, OTB around feet. Pt handed increase in exercise intensity well, no pain or complaints. Single leg stance on altered surface with perturbations. Pt handled well. Pain Level (0-10 scale) post treatment: 0/10    ASSESSMENT/Changes in Function:   Pt is progressing toward therapy goals, pt is having no trouble with home ADL's and states he feels like he wont need to come to therapy soon.      Patient will continue to benefit from skilled PT services to modify and progress therapeutic interventions, address functional mobility deficits, address ROM deficits and address strength deficits to attain remaining goals. Progress towards goals / Updated goals:  1. Pt will increase FOTO score to 76 points to improve ability to perform ADLs. Eval: 69 points     2. Pt will increase MMT left hip ABD/flex to 4+/5 to improve ability to tolerate stairs. Eval: 4-/5, mild pain with flex  Not Met: 4+/5 hip abd, 4-/5 hip flexion (2/1/21)     3. Pt will increase AROM left hip flex to 120 degs to improve ability to performance of work tasks. Eval: 108 degs in supine. Not Met: 95* in supine.  (2/1/21)     4. Pt will be able to ambulate 100 ft in the clinic with no AD, no trendelenburg, no LOB or assist to improve ability to return to performing recreational activities.    Eval: uses SPC with mild trendelenburg, no LOB or assist.   MET, pt shows no gait abnormalities and is walking >100 ft with no AD. (1/18/21)       PLAN  []  Upgrade activities as tolerated     [x]  Continue plan of care  []  Update interventions per flow sheet       []  Discharge due to:_  []  Other:_      Zion Favorite 2/1/2021  6:01 PM    Future Appointments   Date Time Provider Cristina Avila   2/4/2021  6:00 PM Media Aaron, PT [facility]   2/8/2021  6:00 PM Media Aaron, PT [facility]   2/11/2021  6:00 PM Media Aaron, PT [facility]   2/15/2021  6:00 PM Media Aaron, PT [facility]

## 2021-02-04 ENCOUNTER — HOSPITAL ENCOUNTER (OUTPATIENT)
Dept: PHYSICAL THERAPY | Age: 66
Discharge: HOME | End: 2021-02-04
Payer: COMMERCIAL

## 2021-02-04 PROCEDURE — 97110 THERAPEUTIC EXERCISES: CPT

## 2021-02-04 PROCEDURE — 97112 NEUROMUSCULAR REEDUCATION: CPT

## 2021-02-04 NOTE — PROGRESS NOTES
PT DAILY TREATMENT NOTE     Patient Name: Ariel Hawley  Date:2021  : 1955   [x]  Patient  Verified  Payor: OPTIMA / Plan: [employer] / Product Type: HMO /    In time:6:00  Out time:38  Total Treatment Time (min): 38  Visit #: 8 of 10    Treatment Area: Left hip pain [M25.552]    SUBJECTIVE  Pain Level (0-10 scale): 0/10  Any medication changes, allergies to medications, adverse drug reactions, diagnosis change, or new procedure performed?: [x] No    [] Yes (see summary sheet for update)  Subjective functional status/changes:   [] No changes reported  Pt feels better than last time. Continues to improve.     OBJECTIVE    28 min Therapeutic Exercise:  [x] See flow sheet : banded shuffles, thruster, hurdles.    Rationale: increase ROM, increase strength and improve coordination to improve the patient’s ability to carry out ADL's     10 min Neuromuscular Re-education:  [x]  See flow sheet : dynamic balance    Rationale: improve coordination and improve balance  to improve the patient’s ability to carry out ADL's          With   [x] TE   [] TA   [] neuro   [] other: Patient Education: [x] Review HEP    [] Progressed/Changed HEP based on:   [] positioning   [] body mechanics   [] transfers   [] heat/ice application    [] other:      Other Objective/Functional Measures:   Blue board #1, SLS 30\", with ball toss.   Banded shuffle, BTB around knees, GTB around feet.   #10 thruster from mat table with DB. X15, 2 sets.   #10 DB box step up 8\", x12, 2 sets.   Hip flexor valerio, 6 clockwise, 6 counter-CW. 2 sets      Pain Level (0-10 scale) post treatment: 0/10    ASSESSMENT/Changes in Function:   Pt continues to progress. Pt is feeling better each visit, stated we will cancel next visit (21), and following visit will be discharge date (21) baring any change in status..     Patient will continue to benefit from skilled PT services to modify and progress therapeutic interventions, address  functional mobility deficits, address ROM deficits and address strength deficits to attain remaining goals. Progress towards goals / Updated goals:  1. Pt will increase FOTO score to 76 points to improve ability to perform ADLs. Eval: 69 points     2. Pt will increase MMT left hip ABD/flex to 4+/5 to improve ability to tolerate stairs. Eval: 4-/5, mild pain with flex  Met: 4+/5 hip abd, 4+/5 hip flexion (2/4/21)     3. Pt will increase AROM left hip flex to 120 degs to improve ability to performance of work tasks. Eval: 108 degs in supine. Not Met: 95* in supine.  (2/1/21)     4. Pt will be able to ambulate 100 ft in the clinic with no AD, no trendelenburg, no LOB or assist to improve ability to return to performing recreational activities.    Eval: uses SPC with mild trendelenburg, no LOB or assist.   MET, pt shows no gait abnormalities and is walking >100 ft with no AD. (1/18/21)       PLAN  []  Upgrade activities as tolerated     [x]  Continue plan of care  []  Update interventions per flow sheet       []  Discharge due to:_  []  Other:_      Dashawn Richey 2/4/2021  6:00 PM    Future Appointments   Date Time Provider Cristina Avila   2/8/2021  6:00 PM Pearl Warren PT [facility]   2/11/2021  6:00 PM Pearl Warren PT [facility]   2/15/2021  6:00 PM Pearl Warren PT [facility]

## 2021-02-08 ENCOUNTER — APPOINTMENT (OUTPATIENT)
Dept: PHYSICAL THERAPY | Age: 66
End: 2021-02-08
Payer: COMMERCIAL

## 2021-02-15 ENCOUNTER — APPOINTMENT (OUTPATIENT)
Dept: PHYSICAL THERAPY | Age: 66
End: 2021-02-15
Payer: COMMERCIAL

## 2021-02-18 ENCOUNTER — APPOINTMENT (OUTPATIENT)
Dept: PHYSICAL THERAPY | Age: 66
End: 2021-02-18
Payer: COMMERCIAL

## 2022-03-19 PROBLEM — M16.12 OSTEOARTHRITIS OF LEFT HIP: Status: ACTIVE | Noted: 2020-12-09

## 2024-03-18 ENCOUNTER — TRANSCRIBE ORDERS (OUTPATIENT)
Facility: HOSPITAL | Age: 69
End: 2024-03-18

## 2024-03-18 ENCOUNTER — HOSPITAL ENCOUNTER (OUTPATIENT)
Facility: HOSPITAL | Age: 69
Discharge: HOME | End: 2024-03-21
Payer: COMMERCIAL

## 2024-03-18 ENCOUNTER — HOSPITAL ENCOUNTER (OUTPATIENT)
Facility: HOSPITAL | Age: 69
Discharge: HOME | End: 2024-03-21

## 2024-03-18 DIAGNOSIS — Z01.812 BLOOD TESTS PRIOR TO TREATMENT OR PROCEDURE: ICD-10-CM

## 2024-03-18 DIAGNOSIS — M16.11 ARTHRITIS OF RIGHT HIP: ICD-10-CM

## 2024-03-18 DIAGNOSIS — M16.11 ARTHRITIS OF RIGHT HIP: Primary | ICD-10-CM

## 2024-03-18 LAB
EKG ATRIAL RATE: 63 BPM
EKG DIAGNOSIS: NORMAL
EKG P AXIS: 44 DEGREES
EKG P-R INTERVAL: 136 MS
EKG Q-T INTERVAL: 408 MS
EKG QRS DURATION: 90 MS
EKG QTC CALCULATION (BAZETT): 417 MS
EKG R AXIS: 56 DEGREES
EKG T AXIS: 45 DEGREES
EKG VENTRICULAR RATE: 63 BPM
SENTARA SPECIMEN COLLECTION: NORMAL

## 2024-03-18 PROCEDURE — 93010 ELECTROCARDIOGRAM REPORT: CPT | Performed by: INTERNAL MEDICINE

## 2024-03-18 PROCEDURE — 99001 SPECIMEN HANDLING PT-LAB: CPT

## 2024-03-18 PROCEDURE — 93005 ELECTROCARDIOGRAM TRACING: CPT

## 2024-04-11 ENCOUNTER — HOME HEALTH ADMISSION (OUTPATIENT)
Age: 69
End: 2024-04-11
Payer: COMMERCIAL

## 2024-04-12 ENCOUNTER — HOME CARE VISIT (OUTPATIENT)
Age: 69
End: 2024-04-12
Payer: COMMERCIAL

## 2024-04-12 VITALS
OXYGEN SATURATION: 98 % | TEMPERATURE: 97.9 F | DIASTOLIC BLOOD PRESSURE: 70 MMHG | SYSTOLIC BLOOD PRESSURE: 120 MMHG | RESPIRATION RATE: 18 BRPM | HEART RATE: 79 BPM

## 2024-04-12 PROCEDURE — G0151 HHCP-SERV OF PT,EA 15 MIN: HCPCS

## 2024-04-12 ASSESSMENT — ENCOUNTER SYMPTOMS
PAIN LOCATION - PAIN QUALITY: ACHING
DYSPNEA ACTIVITY LEVEL: AFTER AMBULATING MORE THAN 20 FT

## 2024-04-12 NOTE — HOME HEALTH
Skilled services/Home bound verification:     Skilled Reason for admission/summary of clinical condition:  R THR   This patient is homebound for the following reasons Requires considerable and taxing effort to leave the home .    Caregiver: spouse.  Caregiver assists with IADL's.    Medications reconciled and all medications are available in the home this visit.        High risk medication teaching regarding anticoagulants, antiplatelets, antibiotics, antipsychotics, hypoglycemic agents, or opioid/narcotics performed (specify): oxycodone for risk of overdose     Dr. Mj Etienne notified of any discrepancies/look a like medications/medication interactions no severe interaction noted .     Home health supplies by type and quantity ordered/delivered this visit include: mepilex dressing     Patient education provided this visit to include: HEP, fall prevention, dc planning     Pt/Caregiver instructed on plan of care and are agreeable to plan of care at this time.      Physician Dr. Mj Etienne notified of patient admission to home health and plan of care including anticipated frequency of 2w1 3w2 for PT     Discharge planning discussed with patient and caregiver.  Discharge planning as follows: dc when all goals are met .  Pt/Caregiver did verbalize understanding of discharge planning.     PMHx: arthritis      SUBJECTIVE: pain on R hip   LIVING SITUATION/PLOF: Lives with wife in a 1 level house, prior to surgery, he was independent with ADL's and IADL's with difficulty due to chronic pain on R hip and did not use any AD for gait and was able to drive and work   CAREGIVER INVOLVEMENT/ ASSISTANCE NEEDED FOR: Anuradha (spouse) for transportation, meal preparation, mobility/ADL's  MEDICATIONS REVIEWED AND UPDATED:oxycodone Instructed patient/caregiver to take exact amount of narcotics prescribed, signs and symptoms of oversedation, notify SN/PT if oversedated.  May cause constipation, notify SN/PT if no BM x 3 days.

## 2024-04-13 ENCOUNTER — HOME CARE VISIT (OUTPATIENT)
Age: 69
End: 2024-04-13
Payer: COMMERCIAL

## 2024-04-13 PROCEDURE — G0157 HHC PT ASSISTANT EA 15: HCPCS

## 2024-04-15 ENCOUNTER — HOME CARE VISIT (OUTPATIENT)
Age: 69
End: 2024-04-15
Payer: COMMERCIAL

## 2024-04-15 VITALS
RESPIRATION RATE: 14 BRPM | TEMPERATURE: 97.3 F | DIASTOLIC BLOOD PRESSURE: 82 MMHG | SYSTOLIC BLOOD PRESSURE: 122 MMHG | HEART RATE: 93 BPM | OXYGEN SATURATION: 98 %

## 2024-04-15 PROCEDURE — G0151 HHCP-SERV OF PT,EA 15 MIN: HCPCS

## 2024-04-15 ASSESSMENT — ENCOUNTER SYMPTOMS: PAIN LOCATION - PAIN QUALITY: SORENESS

## 2024-04-16 VITALS
RESPIRATION RATE: 16 BRPM | DIASTOLIC BLOOD PRESSURE: 88 MMHG | OXYGEN SATURATION: 98 % | HEART RATE: 72 BPM | TEMPERATURE: 97.6 F | SYSTOLIC BLOOD PRESSURE: 132 MMHG

## 2024-04-16 ASSESSMENT — ENCOUNTER SYMPTOMS: PAIN LOCATION - PAIN QUALITY: ACHING

## 2024-04-16 NOTE — HOME HEALTH
SUBJECTIVE: Pt states he is doing pretty good. Pain is getting better. Denies any falls or medication changes   CAREGIVER INVOLVEMENT/ASSISTANCE NEEDED FOR: Pts wife assists with meals, ADL's and transportation to appointments  HOME HEALTH SUPPLIES BY TYPE AND QUANTITY ORDERED/DELIVERED THIS VISIT INCLUDE: none  OBJECTIVE:  See interventions.  MEDICATIONS: Medications reconciled and all medications are available in the home this visit.  The following education was provided regarding medications, medication interactions, and look a like medications: Continue medication as prescribed by MD. Medications are effective at this time.  PATIENT RESPONSE TO TREATMENT:  Pt reported slight pulling in the  quads with SLS to improve WB tolerance on involved LE which was immediately resolved when weight was shifted back off the leg.   PATIENT LEVEL OF UNDERSTANDING OF EDUCATION PROVIDED: Instructed pt to use frozen peas as a cold pack for better malleability to get cold into the groin area and around the incision site with a skin barrier in place and pts wife states she will go buy some later today.   ASSESSMENT OF PROGRESS TOWARD GOALS: Pt making good progress towards goals. Pt able to tolerate gait training outside with FWRW and Sup/VC, increasing distance significantly w/o significant c/o increased pain.    CONTINUED NEED FOR THE FOLLOWING SKILLS: Pt continues to have paina nd weakness in the R LE placing him at high risk for falls and would benefit from continued PT to improve ambulation and indpeendence with transfers while managing pt pain.   PLAN FOR NEXT VISIT: Increased distance with ambulation over multiple surfaces to improve pts safe egress from home for medical appointments. Change dressing to R hip next visit.  THE FOLLOWING DISCHARGE PLANNING WAS DISCUSSED WITH THE PATIENT/CAREGIVER: DC to family under MD supervision when goals met/max rehab potential

## 2024-04-16 NOTE — HOME HEALTH
Subjective: Patient states that he has been able to better navigate the home recently and that overall his pain is improving.     Objective: Skilled home health physical therapy interventions completed today listed in care plan section.  Interventions performed today to assist in return to prior level of function, address deficits as apparent upon time of initial evaluation, return to community and personal activities, and progress further towards goals as previously established in plan of care. There are not any changes to medications upon timing of this visit. Home health supplies were not ordered/delivered today. Visual inspection finds dressing intact with some post surgical ecchymosis noted around dressing and anterior hip.     Assessment:  Patient is progressing towards goals as previously established in POC with skilled home health physical therapy services at this time as made apparent by improving overall mobility. He was able to complete given standing exercises today with no reports of symptoms. Some mild fatigue noted upon treatment completion. He was able to displayed good understanding of form with scooting to improve overall bed mobility. Family/caregiver spouse involvement is present/set-up at this point in time and assists with meals, transport, and general encouragement.     Plan:  Discharge planning discussed at this visit regarding eventual discharge once patient has met goals and/or met maximum benefit from home health skilled PT services with patient understanding and agreeable at this time. Continued need for skilled home health PT at this time to address deficits, reduce risk of falls, and obtain goals as previously established per plan of care. Attempt outside ambulation as weather permits. Currently 6 more planned skilled home health physical therapy visits at this time with patient both aware and agreeable.

## 2024-04-17 ENCOUNTER — HOME CARE VISIT (OUTPATIENT)
Age: 69
End: 2024-04-17
Payer: COMMERCIAL

## 2024-04-17 VITALS
TEMPERATURE: 97.8 F | HEART RATE: 76 BPM | SYSTOLIC BLOOD PRESSURE: 140 MMHG | DIASTOLIC BLOOD PRESSURE: 80 MMHG | RESPIRATION RATE: 17 BRPM | OXYGEN SATURATION: 98 %

## 2024-04-17 PROCEDURE — G0157 HHC PT ASSISTANT EA 15: HCPCS

## 2024-04-17 ASSESSMENT — ENCOUNTER SYMPTOMS: PAIN LOCATION - PAIN QUALITY: ACHE/SORE
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
## 2024-04-19 ENCOUNTER — HOME CARE VISIT (OUTPATIENT)
Age: 69
End: 2024-04-19
Payer: COMMERCIAL

## 2024-04-19 VITALS
OXYGEN SATURATION: 98 % | DIASTOLIC BLOOD PRESSURE: 76 MMHG | RESPIRATION RATE: 17 BRPM | SYSTOLIC BLOOD PRESSURE: 126 MMHG | HEART RATE: 87 BPM | TEMPERATURE: 97.8 F

## 2024-04-19 PROCEDURE — G0157 HHC PT ASSISTANT EA 15: HCPCS

## 2024-04-19 ASSESSMENT — ENCOUNTER SYMPTOMS: PAIN LOCATION - PAIN QUALITY: ACHE/SORE

## 2024-04-19 NOTE — HOME HEALTH
SUBJECTIVE: Pt denied medication changes, falls, or trips to ER since last visit. \"I'm walking better and sleeping better.\" Pt c/o ongoing distal quad soreness especially during sit<>stand transfers.    CAREGIVER INVOLVEMENT/ASSISTANCE NEEDED FOR: A normal inability to leave home exists and a taxing and substantial effort is required. Unable to leave the home w/o FWW and (A) for safety d/t fall risk. Pt requires assist from CG for some ADLs and transportation to MD appts.     OBJECTIVE: See interventions.     PATIENT EDUCATION PROVIDED THIS VISIT: See interventions.    PATIENT RESPONSE TO EDUCATION PROVIDED: Pt verbalized understanding. Needs reinforcement to avoid lifting AD during turns and proper footwear for fall prevention. Good return demo of tech w/ standing therex.    PATIENT RESPONSE TO TREATMENT: Tolerated well w/o Inc pain or SOB post cont amb. Fatigued and mild CONWAY but vitals remained stable and WNL.    ASSESSMENT OF PROGRESS TOWARD GOALS: Progressing towards goals. Improved gait mechanics. Requires FWW w/ supervision for safety s/p R THR. Transfers w/ less effort.     PLAN FOR NEXT VISIT: Gait/balance training.     THE FOLLOWING DISCHARGE PLANNING WAS DISCUSSED WITH THE PATIENT/CAREGIVER: Discharge to self/caregiver under supervision of MD once PT goals are met or maximum benefits achieved in the HH setting. Anticipated D/C on 4/25/24 w/ transition to OP Rehab.

## 2024-04-22 ENCOUNTER — HOME CARE VISIT (OUTPATIENT)
Age: 69
End: 2024-04-22
Payer: COMMERCIAL

## 2024-04-22 VITALS
DIASTOLIC BLOOD PRESSURE: 80 MMHG | RESPIRATION RATE: 17 BRPM | TEMPERATURE: 97.6 F | HEART RATE: 78 BPM | SYSTOLIC BLOOD PRESSURE: 140 MMHG | OXYGEN SATURATION: 97 %

## 2024-04-22 PROCEDURE — G0157 HHC PT ASSISTANT EA 15: HCPCS

## 2024-04-22 ASSESSMENT — ENCOUNTER SYMPTOMS: PAIN LOCATION - PAIN QUALITY: ACHE/SORE

## 2024-04-22 NOTE — HOME HEALTH
SUBJECTIVE: Pt denied medication changes, falls, or trips to ER since last visit. Plans to go to OP PT at [facility]. Reported started using SPC some.     CAREGIVER INVOLVEMENT/ASSISTANCE NEEDED FOR: A normal inability to leave home exists and a taxing and substantial effort is required. Unable to leave the home w/o FWW and (A) for safety d/t fall risk. Pt requires assist from CG transportation to MD appts.     OBJECTIVE: See interventions.     PATIENT EDUCATION PROVIDED THIS VISIT: See interventions.     PATIENT RESPONSE TO EDUCATION PROVIDED: Pt verbalized understanding. Reinforcement needed for proper footwear for fall prevention.    PATIENT RESPONSE TO TREATMENT: Tolerated well w/o LOB, SOB, or Inc pain. Fatigued and mild CONWAY post cont amb, but vitals remained stable and breathing quickly returned to baseline w/ seated rest.    ASSESSMENT OF PROGRESS TOWARD GOALS: Progressing towards remaining PT goals. Amb Inc distance using SPC but required supervision for fall prevention. Verbalized s/s of infection and 2 techniques to assist w/ pain management (goals met). Approaching max potential for HHPT services.     PLAN FOR NEXT VISIT: Gait training and Tinetti.    THE FOLLOWING DISCHARGE PLANNING WAS DISCUSSED WITH THE PATIENT/CAREGIVER: Discharge to self/CG under supervision of MD w/ transfer to OP Rehab this week. 2 PT visits remain.

## 2024-04-24 ENCOUNTER — HOME CARE VISIT (OUTPATIENT)
Age: 69
End: 2024-04-24
Payer: COMMERCIAL

## 2024-04-24 VITALS
DIASTOLIC BLOOD PRESSURE: 80 MMHG | TEMPERATURE: 97.6 F | OXYGEN SATURATION: 98 % | RESPIRATION RATE: 17 BRPM | HEART RATE: 86 BPM | SYSTOLIC BLOOD PRESSURE: 140 MMHG

## 2024-04-24 PROCEDURE — G0157 HHC PT ASSISTANT EA 15: HCPCS

## 2024-04-24 ASSESSMENT — ENCOUNTER SYMPTOMS: PAIN LOCATION - PAIN QUALITY: ACHE/SORE

## 2024-04-24 NOTE — HOME HEALTH
SUBJECTIVE: Pt denied medication changes, falls, or trips to ER since last visit. No new complaints. Reported pain is getting less daily. Finished oxycodone and currently taking Tylenol for pain.    CAREGIVER INVOLVEMENT/ASSISTANCE NEEDED FOR: A normal inability to leave home exists and a taxing and substantial effort is required. Unable to leave the home w/o FWW and (A) for safety d/t fall risk. Pt requires assist from wife transportation to MD appts.     OBJECTIVE: See interventions.     PATIENT EDUCATION PROVIDED THIS VISIT: See interventions.     PATIENT RESPONSE TO EDUCATION PROVIDED: Pt verbalized/demonstrated understanding.     PATIENT RESPONSE TO TREATMENT: Tolerated well w/o LOB, SOB, or Inc pain.    **Assessment and Summary of Care:  Patient's current functional status before discharge is as follows  Strength: not tested  ROM: not tested  Bed Mobility: IND  Transfers: Mod I  Gait/WC mobility: amb IND inside the home and w/ supervision outdoors using SPC ~900'  Stairs: negotiates 1 porch step Mod I using SPC  Special Tests: Tinetti: 24/28 (11/28 at [facility])  Recommendations: Pt has reached max potential for HHPT services, no longer has skilled need and appropriate to transition to OP Rehab to help pt return to PLOF.     PLAN FOR NEXT VISIT: Agency D/C next visit.    THE FOLLOWING DISCHARGE PLANNING WAS DISCUSSED WITH THE PATIENT/CAREGIVER: Discharge to self/CG under supervision of MD next visit w/ PT Zak to transfer to OP Rehab.

## 2024-04-25 ENCOUNTER — HOME CARE VISIT (OUTPATIENT)
Age: 69
End: 2024-04-25
Payer: COMMERCIAL

## 2024-04-25 VITALS
OXYGEN SATURATION: 98 % | SYSTOLIC BLOOD PRESSURE: 128 MMHG | TEMPERATURE: 96.8 F | HEART RATE: 66 BPM | DIASTOLIC BLOOD PRESSURE: 84 MMHG | RESPIRATION RATE: 16 BRPM

## 2024-04-25 PROCEDURE — G0151 HHCP-SERV OF PT,EA 15 MIN: HCPCS

## 2024-04-26 NOTE — HOME HEALTH
SUBJECTIVE: Pt states he is doing well. Denies any falls or medication changes  CAREGIVER INVOLVEMENT/ASSISTANCE NEEDED FOR: Pts wife assists with meals and transportation to appointments  HOME HEALTH SUPPLIES BY TYPE AND QUANTITY ORDERED/DELIVERED THIS VISIT INCLUDE: none  OBJECTIVE:  See interventions.  MEDICATIONS: Medications reconciled and all medications are available in the home this visit.  The following education was provided regarding medications, medication interactions, and look a like medications: Continue medication as prescribed by MD. Medications are effective at this time.  PATIENT RESPONSE TO TREATMENT:  No increased c/o pain throughout assessment/treatment.  PATIENT LEVEL OF UNDERSTANDING OF EDUCATION PROVIDED: Pt verbalized understanding of discharge from home care. Pt verbalized understanding of importance of compliance with HEP and walking program for continued functioanl progress.   ASSESSMENT OF PROGRESS TOWARD GOALS: Pt has met all goals. Pt is ambulating independently throughout his home with his SC and with supervision out in the community. Pt is independent with transfers from all surfaces in the home. Pt TUG imrpoved to 13.6 secs with SC decreasing his fall risk. Pt is independent with pain management with icing as instructed. No further skill needed for home care at this time. Pt f/u with ortho tomorrow 4/26/24 and plans to return to work next week.  THE FOLLOWING DISCHARGE PLANNING WAS DISCUSSED WITH THE PATIENT/CAREGIVER: DC to family under MD supervision goals met

## (undated) DEVICE — SUT VCRL + 1 36IN CT1 VIO --

## (undated) DEVICE — DRESSING ALG W4XL8IN AG FOAM SUPERABSORBENT SIL ANTIMIC

## (undated) DEVICE — PACK PROCEDURE SURG TOT HIP ANTR CARTER CUST

## (undated) DEVICE — SHEET,DRAPE,40X58,STERILE: Brand: MEDLINE

## (undated) DEVICE — SOL INJ L R 1000ML BG --

## (undated) DEVICE — NEEDLE SPNL 20GA L3.5IN YEL HUB S STL REG WALL FIT STYL W/

## (undated) DEVICE — THE CANADY HYBRID PLASMA SCALPEL IS AN ELECTROSURGICAL PLASMA SCALPEL THAT USES AN 85MM BENDABLE PADDLE BLADE TIP. THE ELECTROSURGICAL PLASMA SCALPEL IS USED TO SIMULTANEOUSLY CUT AND COAGULATE BIOLOGICAL TISSUE.: Brand: CANADY HYBRID PLASMA PADDLE BLADE

## (undated) DEVICE — HANDPIECE SET WITH HIGH FLOW TIP AND SUCTION TUBE: Brand: INTERPULSE

## (undated) DEVICE — SOL IRRIGATION INJ NACL 0.9% 500ML BTL

## (undated) DEVICE — ZIP 16 SURGICAL SKIN CLOSURE DEVICE: Brand: ZIP 16 SURGICAL SKIN CLOSURE DEVICE

## (undated) DEVICE — BLADE SAW 1.27X13X90 MM FOR LG BNE

## (undated) DEVICE — SUT VCRL + 2-0 36IN CT1 UD --

## (undated) DEVICE — GARMENT,MEDLINE,DVT,INT,CALF,MED, GEN2: Brand: MEDLINE